# Patient Record
Sex: FEMALE | Race: WHITE | Employment: FULL TIME | URBAN - METROPOLITAN AREA
[De-identification: names, ages, dates, MRNs, and addresses within clinical notes are randomized per-mention and may not be internally consistent; named-entity substitution may affect disease eponyms.]

---

## 2018-07-13 ENCOUNTER — HOSPITAL ENCOUNTER (INPATIENT)
Dept: CARDIOLOGY UNIT | Age: 43
LOS: 2 days | Discharge: HOME | DRG: 247 | End: 2018-07-15
Attending: EMERGENCY MEDICINE | Admitting: EMERGENCY MEDICINE
Payer: COMMERCIAL

## 2018-07-13 DIAGNOSIS — R07.9 CHEST PAIN, UNSPECIFIED TYPE: Primary | ICD-10-CM

## 2018-07-13 DIAGNOSIS — R94.31 EKG ABNORMALITIES: ICD-10-CM

## 2018-07-13 PROBLEM — I21.3 STEMI (ST ELEVATION MYOCARDIAL INFARCTION) (HCC): Status: ACTIVE | Noted: 2018-07-13

## 2018-07-13 PROBLEM — I21.02: Status: ACTIVE | Noted: 2018-07-13

## 2018-07-13 LAB
A/G RATIO: 1.2 (ref 1.1–2.2)
ALBUMIN SERPL-MCNC: 3.9 G/DL (ref 3.4–5)
ALP BLD-CCNC: 75 U/L (ref 40–129)
ALT SERPL-CCNC: 21 U/L (ref 10–40)
ANION GAP SERPL CALCULATED.3IONS-SCNC: 11 MMOL/L (ref 3–16)
AST SERPL-CCNC: 17 U/L (ref 15–37)
BASOPHILS ABSOLUTE: 0.1 K/UL (ref 0–0.2)
BASOPHILS RELATIVE PERCENT: 0.9 %
BILIRUB SERPL-MCNC: 0.3 MG/DL (ref 0–1)
BUN BLDV-MCNC: 14 MG/DL (ref 7–20)
CALCIUM SERPL-MCNC: 9.7 MG/DL (ref 8.3–10.6)
CHLORIDE BLD-SCNC: 106 MMOL/L (ref 99–110)
CHOLESTEROL, TOTAL: 186 MG/DL (ref 0–199)
CO2: 22 MMOL/L (ref 21–32)
CREAT SERPL-MCNC: 0.6 MG/DL (ref 0.6–1.1)
EKG ATRIAL RATE: 71 BPM
EKG ATRIAL RATE: 80 BPM
EKG DIAGNOSIS: NORMAL
EKG DIAGNOSIS: NORMAL
EKG P AXIS: 50 DEGREES
EKG P AXIS: 55 DEGREES
EKG P-R INTERVAL: 150 MS
EKG P-R INTERVAL: 156 MS
EKG Q-T INTERVAL: 400 MS
EKG Q-T INTERVAL: 426 MS
EKG QRS DURATION: 80 MS
EKG QRS DURATION: 84 MS
EKG QTC CALCULATION (BAZETT): 461 MS
EKG QTC CALCULATION (BAZETT): 462 MS
EKG R AXIS: 49 DEGREES
EKG R AXIS: 81 DEGREES
EKG T AXIS: -9 DEGREES
EKG T AXIS: 68 DEGREES
EKG VENTRICULAR RATE: 71 BPM
EKG VENTRICULAR RATE: 80 BPM
EOSINOPHILS ABSOLUTE: 0.3 K/UL (ref 0–0.6)
EOSINOPHILS RELATIVE PERCENT: 3.4 %
GFR AFRICAN AMERICAN: >60
GFR NON-AFRICAN AMERICAN: >60
GLOBULIN: 3.3 G/DL
GLUCOSE BLD-MCNC: 135 MG/DL (ref 70–99)
HCT VFR BLD CALC: 44.7 % (ref 36–48)
HDLC SERPL-MCNC: 42 MG/DL (ref 40–60)
HEMOGLOBIN: 15.9 G/DL (ref 12–16)
INR BLD: 1.06 (ref 0.86–1.14)
LDL CHOLESTEROL CALCULATED: 99 MG/DL
LYMPHOCYTES ABSOLUTE: 2.1 K/UL (ref 1–5.1)
LYMPHOCYTES RELATIVE PERCENT: 27.9 %
MCH RBC QN AUTO: 33.4 PG (ref 26–34)
MCHC RBC AUTO-ENTMCNC: 35.5 G/DL (ref 31–36)
MCV RBC AUTO: 94.1 FL (ref 80–100)
MONOCYTES ABSOLUTE: 0.5 K/UL (ref 0–1.3)
MONOCYTES RELATIVE PERCENT: 7 %
NEUTROPHILS ABSOLUTE: 4.5 K/UL (ref 1.7–7.7)
NEUTROPHILS RELATIVE PERCENT: 60.8 %
PDW BLD-RTO: 12.9 % (ref 12.4–15.4)
PLATELET # BLD: 201 K/UL (ref 135–450)
PMV BLD AUTO: 8.9 FL (ref 5–10.5)
POC ACT LR: 267 SEC
POTASSIUM SERPL-SCNC: 4.2 MMOL/L (ref 3.5–5.1)
PROTHROMBIN TIME: 12.1 SEC (ref 9.8–13)
RBC # BLD: 4.75 M/UL (ref 4–5.2)
SODIUM BLD-SCNC: 139 MMOL/L (ref 136–145)
TOTAL PROTEIN: 7.2 G/DL (ref 6.4–8.2)
TRIGL SERPL-MCNC: 227 MG/DL (ref 0–150)
TROPONIN: <0.01 NG/ML
VLDLC SERPL CALC-MCNC: 45 MG/DL
WBC # BLD: 7.5 K/UL (ref 4–11)

## 2018-07-13 PROCEDURE — 99152 MOD SED SAME PHYS/QHP 5/>YRS: CPT | Performed by: INTERNAL MEDICINE

## 2018-07-13 PROCEDURE — 99291 CRITICAL CARE FIRST HOUR: CPT | Performed by: INTERNAL MEDICINE

## 2018-07-13 PROCEDURE — 85025 COMPLETE CBC W/AUTO DIFF WBC: CPT

## 2018-07-13 PROCEDURE — 80061 LIPID PANEL: CPT

## 2018-07-13 PROCEDURE — 36415 COLL VENOUS BLD VENIPUNCTURE: CPT

## 2018-07-13 PROCEDURE — C1760 CLOSURE DEV, VASC: HCPCS

## 2018-07-13 PROCEDURE — 71045 X-RAY EXAM CHEST 1 VIEW: CPT

## 2018-07-13 PROCEDURE — 92941 PRQ TRLML REVSC TOT OCCL AMI: CPT | Performed by: INTERNAL MEDICINE

## 2018-07-13 PROCEDURE — 93458 L HRT ARTERY/VENTRICLE ANGIO: CPT | Performed by: INTERNAL MEDICINE

## 2018-07-13 PROCEDURE — 4A023N8 MEASUREMENT OF CARDIAC SAMPLING AND PRESSURE, BILATERAL, PERCUTANEOUS APPROACH: ICD-10-PCS | Performed by: INTERNAL MEDICINE

## 2018-07-13 PROCEDURE — C1874 STENT, COATED/COV W/DEL SYS: HCPCS

## 2018-07-13 PROCEDURE — 99285 EMERGENCY DEPT VISIT HI MDM: CPT

## 2018-07-13 PROCEDURE — 94664 DEMO&/EVAL PT USE INHALER: CPT

## 2018-07-13 PROCEDURE — 80053 COMPREHEN METABOLIC PANEL: CPT

## 2018-07-13 PROCEDURE — 4A023N7 MEASUREMENT OF CARDIAC SAMPLING AND PRESSURE, LEFT HEART, PERCUTANEOUS APPROACH: ICD-10-PCS | Performed by: INTERNAL MEDICINE

## 2018-07-13 PROCEDURE — 9990 CHARGE CONVERSION

## 2018-07-13 PROCEDURE — 93458 L HRT ARTERY/VENTRICLE ANGIO: CPT

## 2018-07-13 PROCEDURE — 85347 COAGULATION TIME ACTIVATED: CPT

## 2018-07-13 PROCEDURE — B2151ZZ FLUOROSCOPY OF LEFT HEART USING LOW OSMOLAR CONTRAST: ICD-10-PCS | Performed by: INTERNAL MEDICINE

## 2018-07-13 PROCEDURE — C1725 CATH, TRANSLUMIN NON-LASER: HCPCS

## 2018-07-13 PROCEDURE — 96375 TX/PRO/DX INJ NEW DRUG ADDON: CPT

## 2018-07-13 PROCEDURE — 84484 ASSAY OF TROPONIN QUANT: CPT

## 2018-07-13 PROCEDURE — 99406 BEHAV CHNG SMOKING 3-10 MIN: CPT

## 2018-07-13 PROCEDURE — C1894 INTRO/SHEATH, NON-LASER: HCPCS

## 2018-07-13 PROCEDURE — 027034Z DILATION OF CORONARY ARTERY, ONE ARTERY WITH DRUG-ELUTING INTRALUMINAL DEVICE, PERCUTANEOUS APPROACH: ICD-10-PCS | Performed by: INTERNAL MEDICINE

## 2018-07-13 PROCEDURE — 93005 ELECTROCARDIOGRAM TRACING: CPT

## 2018-07-13 PROCEDURE — C1769 GUIDE WIRE: HCPCS

## 2018-07-13 PROCEDURE — 85610 PROTHROMBIN TIME: CPT

## 2018-07-13 PROCEDURE — 99255 IP/OBS CONSLTJ NEW/EST HI 80: CPT | Performed by: INTERNAL MEDICINE

## 2018-07-13 PROCEDURE — 96374 THER/PROPH/DIAG INJ IV PUSH: CPT

## 2018-07-13 PROCEDURE — 99152 MOD SED SAME PHYS/QHP 5/>YRS: CPT

## 2018-07-13 PROCEDURE — 92941 PRQ TRLML REVSC TOT OCCL AMI: CPT

## 2018-07-13 PROCEDURE — 99153 MOD SED SAME PHYS/QHP EA: CPT

## 2018-07-13 RX ORDER — FENTANYL CITRATE 50 UG/ML
50 INJECTION, SOLUTION INTRAMUSCULAR; INTRAVENOUS ONCE
Status: COMPLETED | OUTPATIENT
Start: 2018-07-13 | End: 2018-07-13

## 2018-07-13 RX ORDER — SODIUM CHLORIDE 9 MG/ML
INJECTION, SOLUTION INTRAVENOUS CONTINUOUS
Status: DISCONTINUED | OUTPATIENT
Start: 2018-07-13 | End: 2018-07-14

## 2018-07-13 RX ORDER — ASPIRIN 81 MG/1
81 TABLET, CHEWABLE ORAL DAILY
Status: DISCONTINUED | OUTPATIENT
Start: 2018-07-14 | End: 2018-07-15 | Stop reason: HOSPADM

## 2018-07-13 RX ORDER — CARVEDILOL 6.25 MG/1
3.12 TABLET ORAL 2 TIMES DAILY WITH MEALS
Status: DISCONTINUED | OUTPATIENT
Start: 2018-07-13 | End: 2018-07-15 | Stop reason: HOSPADM

## 2018-07-13 RX ORDER — MORPHINE SULFATE 4 MG/ML
INJECTION, SOLUTION INTRAMUSCULAR; INTRAVENOUS
Status: COMPLETED
Start: 2018-07-13 | End: 2018-07-13

## 2018-07-13 RX ORDER — NITROGLYCERIN 20 MG/100ML
5 INJECTION INTRAVENOUS CONTINUOUS
Status: DISCONTINUED | OUTPATIENT
Start: 2018-07-13 | End: 2018-07-14

## 2018-07-13 RX ORDER — ZOLPIDEM TARTRATE 5 MG/1
5 TABLET ORAL NIGHTLY PRN
Status: DISCONTINUED | OUTPATIENT
Start: 2018-07-13 | End: 2018-07-15 | Stop reason: HOSPADM

## 2018-07-13 RX ORDER — LOSARTAN POTASSIUM 25 MG/1
25 TABLET ORAL DAILY
Status: DISCONTINUED | OUTPATIENT
Start: 2018-07-13 | End: 2018-07-15 | Stop reason: HOSPADM

## 2018-07-13 RX ORDER — ACETAMINOPHEN 325 MG/1
650 TABLET ORAL EVERY 4 HOURS PRN
Status: DISCONTINUED | OUTPATIENT
Start: 2018-07-13 | End: 2018-07-15 | Stop reason: HOSPADM

## 2018-07-13 RX ORDER — ONDANSETRON 2 MG/ML
4 INJECTION INTRAMUSCULAR; INTRAVENOUS EVERY 6 HOURS PRN
Status: DISCONTINUED | OUTPATIENT
Start: 2018-07-13 | End: 2018-07-15 | Stop reason: HOSPADM

## 2018-07-13 RX ORDER — LISINOPRIL 10 MG/1
5 TABLET ORAL DAILY
Status: DISCONTINUED | OUTPATIENT
Start: 2018-07-13 | End: 2018-07-13

## 2018-07-13 RX ORDER — MIDAZOLAM HYDROCHLORIDE 1 MG/ML
2 INJECTION INTRAMUSCULAR; INTRAVENOUS ONCE
Status: COMPLETED | OUTPATIENT
Start: 2018-07-13 | End: 2018-07-13

## 2018-07-13 RX ORDER — PRAVASTATIN SODIUM 80 MG/1
80 TABLET ORAL DAILY
Status: ON HOLD | COMMUNITY
End: 2018-07-15 | Stop reason: HOSPADM

## 2018-07-13 RX ORDER — ATORVASTATIN CALCIUM 80 MG/1
80 TABLET, FILM COATED ORAL NIGHTLY
Status: DISCONTINUED | OUTPATIENT
Start: 2018-07-13 | End: 2018-07-15 | Stop reason: HOSPADM

## 2018-07-13 RX ORDER — PANTOPRAZOLE SODIUM 40 MG/1
40 TABLET, DELAYED RELEASE ORAL
Status: DISCONTINUED | OUTPATIENT
Start: 2018-07-13 | End: 2018-07-15 | Stop reason: HOSPADM

## 2018-07-13 RX ORDER — HEPARIN SODIUM 1000 [USP'U]/ML
5000 INJECTION, SOLUTION INTRAVENOUS; SUBCUTANEOUS ONCE
Status: COMPLETED | OUTPATIENT
Start: 2018-07-13 | End: 2018-07-13

## 2018-07-13 RX ADMIN — Medication: at 11:47

## 2018-07-13 RX ADMIN — CARVEDILOL 3.12 MG: 6.25 TABLET, FILM COATED ORAL at 18:00

## 2018-07-13 RX ADMIN — PANTOPRAZOLE SODIUM 40 MG: 40 TABLET, DELAYED RELEASE ORAL at 11:46

## 2018-07-13 RX ADMIN — NITROGLYCERIN 5 MCG/MIN: 20 INJECTION INTRAVENOUS at 06:51

## 2018-07-13 RX ADMIN — FENTANYL CITRATE 50 MCG: 50 INJECTION, SOLUTION INTRAMUSCULAR; INTRAVENOUS at 07:47

## 2018-07-13 RX ADMIN — MORPHINE SULFATE 4 MG: 4 INJECTION, SOLUTION INTRAMUSCULAR; INTRAVENOUS at 06:36

## 2018-07-13 RX ADMIN — HEPARIN SODIUM 5000 UNITS: 1000 INJECTION, SOLUTION INTRAVENOUS; SUBCUTANEOUS at 06:47

## 2018-07-13 RX ADMIN — LOSARTAN POTASSIUM 25 MG: 25 TABLET, FILM COATED ORAL at 13:31

## 2018-07-13 RX ADMIN — TICAGRELOR 90 MG: 90 TABLET ORAL at 20:07

## 2018-07-13 RX ADMIN — CARVEDILOL 3.12 MG: 6.25 TABLET, FILM COATED ORAL at 11:46

## 2018-07-13 RX ADMIN — MIDAZOLAM HYDROCHLORIDE 2 MG: 1 INJECTION INTRAMUSCULAR; INTRAVENOUS at 07:48

## 2018-07-13 RX ADMIN — ATORVASTATIN CALCIUM 80 MG: 80 TABLET, FILM COATED ORAL at 20:07

## 2018-07-13 RX ADMIN — Medication: at 20:07

## 2018-07-13 ASSESSMENT — PAIN SCALES - GENERAL
PAINLEVEL_OUTOF10: 9
PAINLEVEL_OUTOF10: 0
PAINLEVEL_OUTOF10: 9
PAINLEVEL_OUTOF10: 5
PAINLEVEL_OUTOF10: 0

## 2018-07-13 ASSESSMENT — PAIN DESCRIPTION - LOCATION
LOCATION: CHEST
LOCATION: CHEST;BACK;JAW

## 2018-07-13 NOTE — PROGRESS NOTES
Pt refused lisinopril. Stated that it makes her cough really bad. Takes Losartan 50mg PO at home. Dr Vicky Carson notified. Orders for Losartan 25mg PO daily. Will continue to monitor.  Santa Miguel RN

## 2018-07-13 NOTE — ED PROVIDER NOTES
Emergency [facility]  ED    Patient: Evleine Haider  MRN: [id number]  : 1975  Date of Evaluation: 2018  ED Provider: Honey Farias DO    Chief Complaint       Chief Complaint   Patient presents with    Chest Pain     yesterday had bilateral arm heaviness; this morning at 0430 woke up with chest pains; EMS gave 324mg of ASA and Nitro x 1; Air Evac applied 1inch of nitro paste     Myrna Maloney is a 37 y.o. female who presents to the emergency department For chief complaint of chest pain. Patient was flown in by helicopter after EMS evaluated her this morning for onset of chest pain. Patient noticed sudden onset of chest pain around 4:30 AM this morning with associated shortness of breath and diaphoresis. EMS arrived and gave her aspirin and nitro which did improve her symptoms however her EKG showed signs concerning for possible STEMI and so she was flown here for further evaluation. Upon arrival here the patient's chest pain has persisted even with Nitropaste in place. Her EKG does show still concerning findings. Otherwise patient denies fever, headache, cough, abdominal pain, changes in bowel movement or urinary symptoms. Patient does note multiple family members with history of heart conditions      ROS:     Review of Systems   All other systems reviewed and are negative.       Past History     Past Medical History:   Diagnosis Date    Depression     Hyperlipidemia     Hypertension      Past Surgical History:   Procedure Laterality Date     SECTION      DILATION AND CURETTAGE OF UTERUS      HYSTERECTOMY       Social History     Social History    Marital status:      Spouse name: N/A    Number of children: N/A    Years of education: N/A     Social History Main Topics    Smoking status: Current Every Day Smoker     Packs/day: 1.00     Types: Cigarettes    Smokeless tobacco: Never Used    Alcohol use None    Drug performed during the hospital encounter of 07/13/18   CBC Auto Differential   Result Value Ref Range    WBC 7.5 4.0 - 11.0 K/uL    RBC 4.75 4.00 - 5.20 M/uL    Hemoglobin 15.9 12.0 - 16.0 g/dL    Hematocrit 44.7 36.0 - 48.0 %    MCV 94.1 80.0 - 100.0 fL    MCH 33.4 26.0 - 34.0 pg    MCHC 35.5 31.0 - 36.0 g/dL    RDW 12.9 12.4 - 15.4 %    Platelets 540 148 - 429 K/uL    MPV 8.9 5.0 - 10.5 fL    Neutrophils % 60.8 %    Lymphocytes % 27.9 %    Monocytes % 7.0 %    Eosinophils % 3.4 %    Basophils % 0.9 %    Neutrophils # 4.5 1.7 - 7.7 K/uL    Lymphocytes # 2.1 1.0 - 5.1 K/uL    Monocytes # 0.5 0.0 - 1.3 K/uL    Eosinophils # 0.3 0.0 - 0.6 K/uL    Basophils # 0.1 0.0 - 0.2 K/uL   Protime-INR   Result Value Ref Range    Protime 12.1 9.8 - 13.0 sec    INR 1.06 0.86 - 1.14   Troponin   Result Value Ref Range    Troponin <0.01 <0.01 ng/mL   EKG 12 Lead   Result Value Ref Range    Ventricular Rate 80 BPM    Atrial Rate 80 BPM    P-R Interval 150 ms    QRS Duration 80 ms    Q-T Interval 400 ms    QTc Calculation (Bazett) 461 ms    P Axis 55 degrees    R Axis 49 degrees    T Axis -9 degrees    Diagnosis       Normal sinus rhythm  T wave abnormality, consider inferior ischemia  Abnormal ECG  No previous ECGs available       Radiographs:  No results found. Procedures/EKG:   Procedures    ED Course and MDM           In brief, Maryjane Arenas is a 37 y.o. female who presented to the emergency department For chief complaint of chest pain. Patient had concerning EKG findings and was flown in here for possible STEMI. Given her persistent EKG findings as well as her physical appearance, I did consult interventional cardiology. Dr. Rachel Ga agreed that the patient should go to the cath lab, and Dr. Nhung Torre came down to see and evaluate the patient. Patient be taken to Cath Lab for further evaluation, and both patient and family members were made aware of this. She agrees the plan at this time as no further concerns or questions.

## 2018-07-13 NOTE — BRIEF OP NOTE
[address] [facility]                                           Cardiology Procedure Note - Brief                                                     - Full note dictated -     Patient: Jonathan Whalen  Date:7/13/2018  Performing Physician: MD JUSTYN DonovanA    Indications:   Principal Problem:    Acute ST elevation myocardial infarction (STEMI) involving left anterior descending (LAD) coronary artery without development of Q waves ([address]  Resolved Problems:    * No resolved hospital problems. *      Procedure Performed:  1. Coronary angiogram  2. Left heart cath  3. Left ventriculogram  4. PCI of the DIAG with LAURA  5. Angioseal  6. Critical care time 40 minutes    Findings:  1. 99% occlusion of the D1   ~Mild disease of the remaining coronary arteries  2. Normal LVEF  3. Normal hemodynamics     Conclusion/plan of care:  1. DAPT  2.  ICU admission      Forrest Kong MD, [facility] [address], [facility], [address] [facility]  [phone] [facility]  7/13/2018  8:02 AM

## 2018-07-13 NOTE — PRE SEDATION
and Exam:  I have personally completed a history, physical exam & review of systems for this patient (see notes). Prior History of Anesthesia Complications:   none    Modified Mallampati:  II (soft palate, uvula, fauces visible)    ASA Classification:  Class 3 - A patient with severe systemic disease that limits activity but is not incapacitating    Nereida Scale: Activity:  2 - Able to move 4 extremities voluntarily on command  Respiration:  2 - Able to breathe deeply and cough freely  Circulation:  2 - BP+/- 20mmHg of normal  Consciousness:  2 - Fully awake  Oxygen Saturation (color):  2 - Able to maintain oxygen saturation >92% on room air    Sedation/Anesthesia Plan:  Guard the patient's safety and welfare. Minimize physical discomfort and pain. Minimize negative psychological responses to treatment by providing sedation and analgesia and maximize the potential amnesia. Patient to meet pre-procedure discharge plan.     Medication Planned:  midazolam intravenously, fentanyl intravenously    Patient is an appropriate candidate for plan of sedation: yes      Electronically signed by Barry Henley MD on 7/13/2018 at 8:00 AM

## 2018-07-13 NOTE — PROCEDURES
arterial sheath was introduced into  the common femoral artery using single arterial wall puncture and modified  Seldinger technique. Diagnostic catheter was initially Anatoly. Pigtail  catheter was advanced into the left ventricle. Left ventricular  end-diastolic pressure was measured at 13, aortic pressure was 130/80, left  ventricular pressure 140/16. No gradient across pullback. Wall motion  demonstrated EF of 55 to 60% with high lateral wall hypokinesis. Anatoly right diagnostic catheter was used for the right. The right is a  large vessel, probably codominant vessel, has a large PDA and small  posterolateral branch. There are some mild luminal irregularities  throughout the right coronary artery. The LAD is visualized with the XB  3.5 guide catheters. Left main is short. The left main has no disease. The LAD is visualized, moderate size vessel, has some mild disease. Mid  segment of the first diagonal is occluded, has 99.9% area of  atherosclerosis and TIMI3 flow in the distal vessel, likely cause for the  culprit and EKG changes. Circ is a large vessel, gives rise to a large  obtuse marginal branch and a large posterolateral branch. There is mild  disease within the circ, but nothing critical.  It appeared that the  culprit artery is the diag. At this point, the adjunctive pharmacotherapy  with aspirin, Aggrastat, heparin, and Brilinta was initiated. BMW wire was  advanced to the first diagonal without difficulty, second BMW into the LAD. We then used a 2.0 x 8 mini-TREK balloon and did balloon inflation of the  diagonal, which restored TIMI3 flow and resolution of the patient's chest  pain. At this point, we elected to place a stent, put a 2.0 x 8 Resolute  drug-eluting stent into the diagonal and covering the ostium and without  disruption of the LAD. At this point, intracoronary nitroglycerin was  given and wires were removed.   Post-angiography views revealed excellent  results with

## 2018-07-13 NOTE — CONSULTS
[facility]   Cardiovascular Evaluation    PATIENT: Mayela Willard  DATE: 2018  MRN: [id number]  CSN: [id number]  : 1975    Primary Care Doctor: DAVID Yao CNP    Reason for evaluation/Chief complaint:   Chest Pain (yesterday had bilateral arm heaviness; this morning at 0430 woke up with chest pains; EMS gave 324mg of ASA and Nitro x 1; Air Evac applied 1inch of nitro paste)      Subjective:    History of present illness on initial date of evaluation:   Mayela Willard is a 37 y.o. patient who presents with onset of chest pain. They described the chest pain as a pressure sensation on the anterior of the chest. The pain is at times radiating to the left arm/jaw area. The pain is at rest. They state that the pain has progressed and gotten worse since the time of onset. There is associated shortness or breath. At times, there is also associated diaphoresis. The pain has progressed to the point that medications have not resolved the pain. She awoke at 4 AM with severe chest pain but had been having symptoms since yesterday. Called EMS and activated helicopter transport to our ER. On arrival, confirmed STACEY             Patient Active Problem List   Diagnosis    Acute ST elevation myocardial infarction (STEMI) involving left anterior descending (LAD) coronary artery without development of Q waves ([address]         Cardiac Testing: I have reviewed the findings below. EKG:  ECHO:   STRESS TEST:  CATH:  BYPASS:  VASCULAR:    Past Medical History:   has a past medical history of Depression; Hyperlipidemia; and Hypertension. Surgical History:   has a past surgical history that includes  section; Hysterectomy; and Dilation and curettage of uterus. Social History:   reports that she has been smoking Cigarettes. She has been smoking about 1.00 pack per day.  She has never used smokeless tobacco.     Family History:  No evidence for sudden cardiac death or and stress minimization. 4. The patient should be treated with these therapies unless contraindicated:   ~asa daily   ~beta-blockers   ~statin therapy   ~ACE/ARB   ~repeat troponin until down trending   ~EKG as clinically needed  5. Keep NPO   6. Pre-cath orders will be placed. All questions and concerns were addressed to the patient/family. Alternatives to my treatment were discussed. The note was completed using EMR. Every effort was made to ensure accuracy; however, inadvertent computerized transcription errors may be present.     Yolanda Jessica MD, [address], [facility]  [phone] [place] office  [phone] [facility] central  7/13/2018  8:03 AM

## 2018-07-13 NOTE — PAYOR INFORMATION
CHAPIN ESPINAL CALLED IN . .. REF# [id number] PER LAUREN. .. UR  [facility] TO [facility] Maverick Deng ATTN# REF# [id number]

## 2018-07-14 LAB
ANION GAP SERPL CALCULATED.3IONS-SCNC: 11 MMOL/L (ref 3–16)
BUN BLDV-MCNC: 13 MG/DL (ref 7–20)
CALCIUM SERPL-MCNC: 8.8 MG/DL (ref 8.3–10.6)
CHLORIDE BLD-SCNC: 100 MMOL/L (ref 99–110)
CO2: 24 MMOL/L (ref 21–32)
CREAT SERPL-MCNC: 0.6 MG/DL (ref 0.6–1.1)
EKG ATRIAL RATE: 95 BPM
EKG DIAGNOSIS: NORMAL
EKG P AXIS: 47 DEGREES
EKG P-R INTERVAL: 154 MS
EKG Q-T INTERVAL: 398 MS
EKG QRS DURATION: 82 MS
EKG QTC CALCULATION (BAZETT): 500 MS
EKG R AXIS: 62 DEGREES
EKG T AXIS: 104 DEGREES
EKG VENTRICULAR RATE: 95 BPM
GFR AFRICAN AMERICAN: >60
GFR NON-AFRICAN AMERICAN: >60
GLUCOSE BLD-MCNC: 104 MG/DL (ref 70–99)
HCT VFR BLD CALC: 43.3 % (ref 36–48)
HEMOGLOBIN: 15.3 G/DL (ref 12–16)
LV EF: 65 %
LVEF MODALITY: NORMAL
MCH RBC QN AUTO: 33.2 PG (ref 26–34)
MCHC RBC AUTO-ENTMCNC: 35.3 G/DL (ref 31–36)
MCV RBC AUTO: 94.1 FL (ref 80–100)
PDW BLD-RTO: 13.1 % (ref 12.4–15.4)
PLATELET # BLD: 192 K/UL (ref 135–450)
PMV BLD AUTO: 9.5 FL (ref 5–10.5)
POTASSIUM REFLEX MAGNESIUM: 3.6 MMOL/L (ref 3.5–5.1)
RBC # BLD: 4.61 M/UL (ref 4–5.2)
SODIUM BLD-SCNC: 135 MMOL/L (ref 136–145)
WBC # BLD: 9.7 K/UL (ref 4–11)

## 2018-07-14 PROCEDURE — 80048 BASIC METABOLIC PNL TOTAL CA: CPT

## 2018-07-14 PROCEDURE — 85027 COMPLETE CBC AUTOMATED: CPT

## 2018-07-14 PROCEDURE — 2000000000 HC ICU R&B

## 2018-07-14 PROCEDURE — 36415 COLL VENOUS BLD VENIPUNCTURE: CPT

## 2018-07-14 PROCEDURE — 93010 ELECTROCARDIOGRAM REPORT: CPT | Performed by: INTERNAL MEDICINE

## 2018-07-14 PROCEDURE — 9990 CHARGE CONVERSION

## 2018-07-14 PROCEDURE — 99232 SBSQ HOSP IP/OBS MODERATE 35: CPT | Performed by: NURSE PRACTITIONER

## 2018-07-14 PROCEDURE — 6370000000 HC RX 637 (ALT 250 FOR IP): Performed by: HOSPITALIST

## 2018-07-14 PROCEDURE — 93307 TTE W/O DOPPLER COMPLETE: CPT | Performed by: INTERNAL MEDICINE

## 2018-07-14 PROCEDURE — 93005 ELECTROCARDIOGRAM TRACING: CPT | Performed by: INTERNAL MEDICINE

## 2018-07-14 PROCEDURE — 6370000000 HC RX 637 (ALT 250 FOR IP): Performed by: INTERNAL MEDICINE

## 2018-07-14 PROCEDURE — 94761 N-INVAS EAR/PLS OXIMETRY MLT: CPT

## 2018-07-14 RX ORDER — POTASSIUM CHLORIDE 750 MG/1
40 TABLET, FILM COATED, EXTENDED RELEASE ORAL ONCE
Status: COMPLETED | OUTPATIENT
Start: 2018-07-14 | End: 2018-07-14

## 2018-07-14 RX ADMIN — TICAGRELOR 90 MG: 90 TABLET ORAL at 21:06

## 2018-07-14 RX ADMIN — TICAGRELOR 90 MG: 90 TABLET ORAL at 08:01

## 2018-07-14 RX ADMIN — POTASSIUM CHLORIDE 40 MEQ: 750 TABLET, EXTENDED RELEASE ORAL at 11:43

## 2018-07-14 RX ADMIN — ATORVASTATIN CALCIUM 80 MG: 80 TABLET, FILM COATED ORAL at 21:06

## 2018-07-14 RX ADMIN — ASPIRIN 81 MG 81 MG: 81 TABLET ORAL at 08:01

## 2018-07-14 RX ADMIN — PANTOPRAZOLE SODIUM 40 MG: 40 TABLET, DELAYED RELEASE ORAL at 08:01

## 2018-07-14 RX ADMIN — CARVEDILOL 3.12 MG: 6.25 TABLET, FILM COATED ORAL at 16:25

## 2018-07-14 RX ADMIN — Medication: at 08:07

## 2018-07-14 RX ADMIN — CARVEDILOL 3.12 MG: 6.25 TABLET, FILM COATED ORAL at 08:01

## 2018-07-14 RX ADMIN — Medication: at 21:07

## 2018-07-14 RX ADMIN — LOSARTAN POTASSIUM 25 MG: 25 TABLET, FILM COATED ORAL at 08:01

## 2018-07-14 ASSESSMENT — PAIN SCALES - GENERAL
PAINLEVEL_OUTOF10: 0

## 2018-07-14 NOTE — PROGRESS NOTES
Shift assessment completed. Pt resting in bed, denies complaint. VSS. Right groin cath site soft, no bruising, swelling or hematoma. NSR on the monitor. Lungs clear. Will monitor closely.

## 2018-07-14 NOTE — PROGRESS NOTES
Assessment unchanged. Pt up to the bathroom, tolerated well. Groin site c/d/i. Soft, no hematoma. Will monitor.

## 2018-07-14 NOTE — PROGRESS NOTES
[facility]   Daily Progress Note    Admit Date:  7/13/2018  HPI:    Chief Complaint   Patient presents with    Chest Pain     yesterday had bilateral arm heaviness; this morning at 0430 woke up with chest pains; EMS gave 324mg of ASA and Nitro x 1; Air Evac applied 1inch of nitro paste        Interval history: Ajith Pinedo is being seen for STEMI. Subjective:  Ms. Heide Velez denies any chest pain. Complains of very occasional shortness of breath with coming out of sleep, last a few seconds. Objective:   /77   Pulse 79   Temp 98 °F (36.7 °C) (Oral)   Resp 18   Ht 5' 4\" (1.626 m)   Wt 169 lb 12.1 oz (77 kg)   SpO2 97%   BMI 29.14 kg/m²     Intake/Output Summary (Last 24 hours) at 07/14/18 1250  Last data filed at 07/14/18 1137   Gross per 24 hour   Intake             3958 ml   Output              500 ml   Net             3458 ml       NYHA: I    Physical Exam:  General:  Awake, alert, NAD  Skin:  Warm and dry  Neck:  JVD<8  Chest:  Clear to auscultation, no wheezes/rhonchi/rales  Cardiovascular:  RRR S1S2, no m/r/g  Abdomen:  Soft, nontender, +bowel sounds  Extremities:  No BLE edema    Medications:    carvedilol  3.125 mg Oral BID WC    atorvastatin  80 mg Oral Nightly    aspirin  81 mg Oral Daily    ticagrelor  90 mg Oral BID    pantoprazole  40 mg Oral QAM AC    mupirocin   Nasal BID    losartan  25 mg Oral Daily      nitroGLYCERIN Stopped (07/13/18 0826)    sodium chloride Stopped (07/13/18 1954)       Lab Data:  CBC:   Recent Labs      07/13/18 0628  07/14/18   0414   WBC  7.5  9.7   HGB  15.9  15.3   PLT  201  192     BMP:  Recent Labs      07/13/18 0628  07/14/18   0414   NA  139  135*   K  4.2  3.6   CO2  22  24   BUN  14  13   CREATININE  0.6  0.6     INR:    Recent Labs      07/13/18 0628   INR  1.06     BNP:  No results for input(s): PROBNP in the last 72 hours. No results found for: LVEF, LVEFMODE    PROCEDURES PERFORMED: 7/13/18  1.   Selective

## 2018-07-14 NOTE — PROGRESS NOTES
Bedside report received. 4 eyed skin assessment completed. No evidence of skin breakdown. Mepilex dsg on sacrum. Pt alert and oriented x4. Denies chest pain. R fem- soft, no hematoma or bruising at site. Pt on RA. SpO2 98%, NSR 90s, /91. Alarms on and audible. Will continue to monitor.  Dede Gonzalez RN

## 2018-07-15 VITALS
HEIGHT: 64 IN | HEART RATE: 90 BPM | BODY MASS INDEX: 29.17 KG/M2 | RESPIRATION RATE: 18 BRPM | SYSTOLIC BLOOD PRESSURE: 123 MMHG | WEIGHT: 170.86 LBS | OXYGEN SATURATION: 98 % | TEMPERATURE: 98.4 F | DIASTOLIC BLOOD PRESSURE: 84 MMHG

## 2018-07-15 LAB
ANION GAP SERPL CALCULATED.3IONS-SCNC: 12 MMOL/L (ref 3–16)
BUN BLDV-MCNC: 13 MG/DL (ref 7–20)
CALCIUM SERPL-MCNC: 9.4 MG/DL (ref 8.3–10.6)
CHLORIDE BLD-SCNC: 100 MMOL/L (ref 99–110)
CO2: 28 MMOL/L (ref 21–32)
CREAT SERPL-MCNC: 0.6 MG/DL (ref 0.6–1.1)
GFR AFRICAN AMERICAN: >60
GFR NON-AFRICAN AMERICAN: >60
GLUCOSE BLD-MCNC: 113 MG/DL (ref 70–99)
POTASSIUM SERPL-SCNC: 3.9 MMOL/L (ref 3.5–5.1)
SODIUM BLD-SCNC: 140 MMOL/L (ref 136–145)

## 2018-07-15 PROCEDURE — 6370000000 HC RX 637 (ALT 250 FOR IP): Performed by: INTERNAL MEDICINE

## 2018-07-15 PROCEDURE — 36415 COLL VENOUS BLD VENIPUNCTURE: CPT

## 2018-07-15 PROCEDURE — 99232 SBSQ HOSP IP/OBS MODERATE 35: CPT | Performed by: NURSE PRACTITIONER

## 2018-07-15 PROCEDURE — 80048 BASIC METABOLIC PNL TOTAL CA: CPT

## 2018-07-15 RX ORDER — LOSARTAN POTASSIUM 25 MG/1
25 TABLET ORAL DAILY
Qty: 30 TABLET | Refills: 3 | Status: SHIPPED | OUTPATIENT
Start: 2018-07-15 | End: 2018-10-02 | Stop reason: SDUPTHER

## 2018-07-15 RX ORDER — CARVEDILOL 3.12 MG/1
3.12 TABLET ORAL 2 TIMES DAILY WITH MEALS
Qty: 60 TABLET | Refills: 3 | Status: SHIPPED | OUTPATIENT
Start: 2018-07-15 | End: 2018-10-02 | Stop reason: SDUPTHER

## 2018-07-15 RX ORDER — ATORVASTATIN CALCIUM 80 MG/1
80 TABLET, FILM COATED ORAL NIGHTLY
Qty: 30 TABLET | Refills: 3 | Status: SHIPPED | OUTPATIENT
Start: 2018-07-15 | End: 2018-10-02 | Stop reason: SDUPTHER

## 2018-07-15 RX ORDER — ASPIRIN 81 MG/1
81 TABLET, CHEWABLE ORAL DAILY
Qty: 30 TABLET | Refills: 3 | Status: SHIPPED | OUTPATIENT
Start: 2018-07-15 | End: 2018-11-11 | Stop reason: SDUPTHER

## 2018-07-15 RX ADMIN — PANTOPRAZOLE SODIUM 40 MG: 40 TABLET, DELAYED RELEASE ORAL at 08:54

## 2018-07-15 RX ADMIN — CARVEDILOL 3.12 MG: 6.25 TABLET, FILM COATED ORAL at 08:53

## 2018-07-15 RX ADMIN — LOSARTAN POTASSIUM 25 MG: 25 TABLET, FILM COATED ORAL at 08:54

## 2018-07-15 RX ADMIN — Medication: at 08:53

## 2018-07-15 RX ADMIN — ASPIRIN 81 MG 81 MG: 81 TABLET ORAL at 08:54

## 2018-07-15 RX ADMIN — TICAGRELOR 90 MG: 90 TABLET ORAL at 08:54

## 2018-07-15 ASSESSMENT — PAIN SCALES - GENERAL
PAINLEVEL_OUTOF10: 0
PAINLEVEL_OUTOF10: 0

## 2018-07-15 NOTE — PROGRESS NOTES
AVS completed, reviewed and questions answered. Patient received copy of AVS and original signed & in chart. Belongings with patient.  driving. Wheelchaired to the car at entrance. Patient stable. PIV removed.  Right groin intact/SHIVAM/no drainage    Gautam Sosa RN

## 2018-07-15 NOTE — PROGRESS NOTES
Went in to assess pt who was in bed talking on the phone. Pt asked if I could come back in a little while. Appeared comfortable, no apparent distress. Will return shortly.

## 2018-07-15 NOTE — PROGRESS NOTES
Hospitalist Progress Note      PCP: DAVID Westfall CNP    Date of Admission: 7/13/2018    Chief Complaint: chest pain    Hospital Course: this is 18-year-old female with history of hypertension, hyperlipidemia, tobacco abuse admitted with STEMI status post angiogram and stent to LAD on 7/13/18. Subjective: patient denies any chest pain or shortness of breath no nausea vomiting      Medications:  Reviewed    Infusion Medications   Scheduled Medications    carvedilol  3.125 mg Oral BID WC    atorvastatin  80 mg Oral Nightly    aspirin  81 mg Oral Daily    ticagrelor  90 mg Oral BID    pantoprazole  40 mg Oral QAM AC    mupirocin   Nasal BID    losartan  25 mg Oral Daily     PRN Meds: acetaminophen, magnesium hydroxide, ondansetron, zolpidem      Intake/Output Summary (Last 24 hours) at 07/15/18 0964  Last data filed at 07/15/18 0417   Gross per 24 hour   Intake             1560 ml   Output             2150 ml   Net             -590 ml       Physical Exam Performed:    /84   Pulse 90   Temp 98.4 °F (36.9 °C) (Oral)   Resp 18   Ht 5' 4\" (1.626 m)   Wt 170 lb 13.7 oz (77.5 kg)   SpO2 98%   BMI 29.33 kg/m²     General appearance: No apparent distress, appears stated age and cooperative. HEENT: Pupils equal, round, and reactive to light. Conjunctivae/corneas clear. Neck: Supple, with full range of motion. No jugular venous distention. Trachea midline. Respiratory:  Normal respiratory effort. Clear to auscultation, bilaterally without Rales/Wheezes/Rhonchi. Cardiovascular: Regular rate and rhythm with normal S1/S2 without murmurs, rubs or gallops. Abdomen: Soft, non-tender, non-distended with normal bowel sounds. Musculoskeletal: No clubbing, cyanosis or edema bilaterally. Full range of motion without deformity. Skin: Skin color, texture, turgor normal.  No rashes or lesions. Neurologic:  Neurovascularly intact without any focal sensory/motor deficits.  Cranial nerves: II-XII intact, grossly non-focal.  Psychiatric: Alert and oriented, thought content appropriate, normal insight  Capillary Refill: Brisk,< 3 seconds   Peripheral Pulses: +2 palpable, equal bilaterally       Labs:   Recent Labs      07/13/18   0628  07/14/18   0414   WBC  7.5  9.7   HGB  15.9  15.3   HCT  44.7  43.3   PLT  201  192     Recent Labs      07/13/18   0628  07/14/18   0414  07/15/18   0855   NA  139  135*  140   K  4.2  3.6  3.9   CL  106  100  100   CO2  22  24  28   BUN  14  13  13   CREATININE  0.6  0.6  0.6   CALCIUM  9.7  8.8  9.4     Recent Labs      07/13/18   0628   AST  17   ALT  21   BILITOT  0.3   ALKPHOS  75     Recent Labs      07/13/18 0628   INR  1.06     Recent Labs      07/13/18 0628   TROPONINI  <0.01       Urinalysis:    Lab Results   Component Value Date    NITRU Negative 04/26/2016    WBCUA 3-5 04/26/2016    BACTERIA 1+ 04/26/2016    RBCUA 5-10 04/26/2016    BLOODU SMALL 04/26/2016    SPECGRAV 1.020 04/26/2016    GLUCOSEU Negative 04/26/2016       Radiology:  XR CHEST PORTABLE   Final Result   Negative portable chest.                 Assessment/Plan:    Active Hospital Problems    Diagnosis Date Noted    Coronary artery disease involving native coronary artery of native heart without angina pectoris [I25.10]     Shortness of breath [R06.02]     Essential hypertension [I10]     Tobacco abuse [Z72.0]     Acute ST elevation myocardial infarction (STEMI) involving left anterior descending (LAD) coronary artery without development of Q waves ([address] [I21.02] 07/13/2018    STEMI (ST elevation myocardial infarction) ([facility] [facility] [I21.3] 07/13/2018    Chest pain [R07.9]    1. This is 47-year-old female admitted with STEMI status post angiogram and stent to LAD on 7/13/2018, continue the current care per per cardiology normal 2-D echo. Patient will be discharged home today to follow with the PCP within a week cardiology as instructed. 2.  Hypertension control continue with current meds.   3. Hyperlipidemia on high-dose statin.   4.  Tobacco abuse recommended patient to stop smoking      DVT Prophylaxis: Per cardiology  Diet: DIET CARDIAC;  Code Status: Full Code    PT/OT Eval Status:     Dispo - home    Giuseppe Plunkett MD

## 2018-07-15 NOTE — PROGRESS NOTES
4 Eyes Skin Assessment     The patient is being assess for   Shift Handoff    I agree that 2 RN's have performed a thorough Head to Toe Skin Assessment on the patient. ALL assessment sites listed below have been assessed. Areas assessed by both nurses:   [x]   Head, Face, and Ears   [x]   Shoulders, Back, and Chest, Abdomen  [x]   Arms, Elbows, and Hands   [x]   Coccyx, Sacrum, and Ischium  [x]   Legs, Feet, and Heels            **SHARE this note so that the co-signing nurse is able to place an eSignature**    Co-signer eSignature: Electronically signed by Cindy Chávez RN on 7/15/18 at 7:22 AM    Does the Patient have Skin Breakdown?   No          Vinh Prevention initiated:  NA   Wound Care Orders initiated:  NA      WOC nurse consulted for Pressure Injury (Stage 3,4, Unstageable, DTI, NWPT, Complex wounds)and New or Established Ostomies:  NA      Primary Nurse eSignature: Electronically signed by Tomasa Tran RN on 7/15/18 at 7:16 AM

## 2018-07-15 NOTE — PROGRESS NOTES
Source: [facility] Department: C2 Progressive Care/Cardiac ICU TimeToRetrieve: N/A Number: [phone] Requested: Loa Hodgkin Contacted: Loa Hodgkin Method: Secure Text Date / Time: 7/15 11:24 AM Details:  [address] Provider Message: [phone] FROM: Chapin Smart RN A C2 PROGRESSIVE CARE/CARDIAC ICU ANY UPDATE ON ETA FOR PATIENT DISCHARGE       Sharon Beach RN

## 2018-07-20 ENCOUNTER — TELEPHONE (OUTPATIENT)
Dept: CARDIOLOGY | Age: 43
End: 2018-07-20

## 2018-07-20 ENCOUNTER — TELEPHONE (OUTPATIENT)
Dept: CARDIOLOGY CLINIC | Age: 43
End: 2018-07-20

## 2018-07-20 NOTE — TELEPHONE ENCOUNTER
Attempted to call pt. Pt phone is currently not working. Ghazala Domingo NP would like to know when pt may return to work. Attempted to contact pt to find out what they do for a living. Pt phone is not currently in service. 7/13/2018 procedure with OSCAR WHALEN. [facility] Please advise. Will call [facility] at [phone].

## 2018-08-01 ENCOUNTER — OFFICE VISIT (OUTPATIENT)
Dept: CARDIOLOGY CLINIC | Age: 43
End: 2018-08-01

## 2018-08-01 VITALS
HEART RATE: 90 BPM | HEIGHT: 64 IN | SYSTOLIC BLOOD PRESSURE: 124 MMHG | WEIGHT: 169 LBS | OXYGEN SATURATION: 97 % | BODY MASS INDEX: 28.85 KG/M2 | DIASTOLIC BLOOD PRESSURE: 68 MMHG

## 2018-08-01 DIAGNOSIS — Z72.0 TOBACCO ABUSE: ICD-10-CM

## 2018-08-01 DIAGNOSIS — I21.3 ST ELEVATION MYOCARDIAL INFARCTION (STEMI), SUBSEQUENT EPISODE OF CARE (HCC): Primary | ICD-10-CM

## 2018-08-01 DIAGNOSIS — I10 ESSENTIAL HYPERTENSION: ICD-10-CM

## 2018-08-01 DIAGNOSIS — I25.10 CORONARY ARTERY DISEASE INVOLVING NATIVE CORONARY ARTERY OF NATIVE HEART WITHOUT ANGINA PECTORIS: ICD-10-CM

## 2018-08-01 DIAGNOSIS — E78.00 PURE HYPERCHOLESTEROLEMIA: ICD-10-CM

## 2018-08-01 PROCEDURE — 99213 OFFICE O/P EST LOW 20 MIN: CPT | Performed by: NURSE PRACTITIONER

## 2018-08-01 NOTE — PATIENT INSTRUCTIONS
1. Continue current heart medicines  2. Referral to cardiac rehab at [facility]  3. Follow up with Dr. Wale Knapp in 3 months     Ref.  Range 7/13/2018 06:26   Cholesterol, Total Latest Ref Range: 0 - 199 mg/dL 186   HDL Cholesterol Latest Ref Range: 40 - 60 mg/dL 42   LDL Calculated Latest Ref Range: <70 mg/dL 99   Triglycerides Latest Ref Range: 0 - 150 mg/dL 227 (H)

## 2018-08-01 NOTE — PROGRESS NOTES
Component Value Date    TRIG 227 07/13/2018    HDL 42 07/13/2018    LDLCALC 99 07/13/2018     Cardiac Imaging:  Echo 7/14/18  Normal left ventricular systolic function with ejection fraction of 65%. Mild concentric left ventricular hypertrophy. Grade I diastolic dysfunction with normal filling pressure. Trivial mitral regurgitation    PROCEDURES PERFORMED: 7/13/18  1. Selective coronary angiography. 2.  Left heart catheterization. 3.  Left ventriculography. 4.  Percutaneous coronary intervention of the first diagonal with an [place] drug-eluting stent. 5.  Angio-Seal device deployment. 6.  Critical care time of 40 minutes. ANGIOGRAPHY FINDINGS:  1.  A 99% stenosis of the first diagonal branch with mild disease of the remaining coronary arteries. 2.  Normal left ventricular function with ejection fraction of 55% with high lateral wall hypokinesis. 3.  Normal hemodynamics. Assessment:    1. ST elevation myocardial infarction (STEMI), subsequent episode of care ([facility] [address]    2. Coronary artery disease involving native coronary artery of native heart without angina pectoris    3. Essential hypertension    4. Pure hypercholesterolemia    5. Tobacco abuse          Plan:   1. Continue current heart medicines  2. Referral to cardiac rehab at [facility]  3. Follow up with Dr. Brittaney Alvarado in 3 months      I appreciate the opportunity of cooperating in the care of this individual.    Emmanuelle Cardona CNP, 8/1/2018, 3:25 PM    QUALITY MEASURES  1. Tobacco Cessation Counseling: Yes  2. Retake of BP if >140/90:   NA  3. Documentation to PCP/referring for new patient:  Sent to PCP at close of office visit  4. CAD patient on anti-platelet: Yes  5. CAD patient on STATIN therapy:  Yes  6.  Patient with CHF and aFib on anticoagulation:  NA

## 2018-10-02 RX ORDER — CARVEDILOL 3.12 MG/1
3.12 TABLET ORAL 2 TIMES DAILY WITH MEALS
Qty: 180 TABLET | Refills: 3 | Status: SHIPPED | OUTPATIENT
Start: 2018-10-02 | End: 2019-09-04 | Stop reason: SDUPTHER

## 2018-10-02 RX ORDER — LOSARTAN POTASSIUM 25 MG/1
25 TABLET ORAL DAILY
Qty: 90 TABLET | Refills: 3 | Status: SHIPPED | OUTPATIENT
Start: 2018-10-02 | End: 2018-11-02 | Stop reason: SINTOL

## 2018-10-02 RX ORDER — ATORVASTATIN CALCIUM 80 MG/1
80 TABLET, FILM COATED ORAL NIGHTLY
Qty: 90 TABLET | Refills: 3 | Status: SHIPPED | OUTPATIENT
Start: 2018-10-02 | End: 2019-05-09 | Stop reason: SINTOL

## 2018-11-02 ENCOUNTER — OFFICE VISIT (OUTPATIENT)
Dept: CARDIOLOGY CLINIC | Age: 43
End: 2018-11-02
Payer: COMMERCIAL

## 2018-11-02 VITALS
HEIGHT: 64 IN | BODY MASS INDEX: 28.27 KG/M2 | WEIGHT: 165.6 LBS | DIASTOLIC BLOOD PRESSURE: 62 MMHG | HEART RATE: 70 BPM | SYSTOLIC BLOOD PRESSURE: 92 MMHG | OXYGEN SATURATION: 98 %

## 2018-11-02 DIAGNOSIS — R06.02 SHORTNESS OF BREATH: Primary | ICD-10-CM

## 2018-11-02 DIAGNOSIS — I21.29 ST ELEVATION MYOCARDIAL INFARCTION (STEMI) INVOLVING OTHER CORONARY ARTERY (HCC): ICD-10-CM

## 2018-11-02 DIAGNOSIS — I25.10 CORONARY ARTERY DISEASE INVOLVING NATIVE CORONARY ARTERY OF NATIVE HEART WITHOUT ANGINA PECTORIS: ICD-10-CM

## 2018-11-02 DIAGNOSIS — Z72.0 TOBACCO ABUSE: ICD-10-CM

## 2018-11-02 DIAGNOSIS — I10 ESSENTIAL HYPERTENSION: ICD-10-CM

## 2018-11-02 PROCEDURE — 99214 OFFICE O/P EST MOD 30 MIN: CPT | Performed by: INTERNAL MEDICINE

## 2018-11-02 RX ORDER — FLUOXETINE HYDROCHLORIDE 20 MG/1
20 CAPSULE ORAL DAILY
COMMUNITY
End: 2020-12-02 | Stop reason: ALTCHOICE

## 2018-11-02 NOTE — COMMUNICATION BODY
beneficial to you as this provides a structured program and education on your health. I strongly encourage you to participate in this. 4. I want you to feel confidant in your health and know that with the right changes, you will do very well. I have no restrictions for you at this time. 5. Follow up with [facility] in 6 months. This note was scribed in the presence of Gely Walsh MD, by Nini Walters RN.      I, Dr. Gely Walsh, personally performed the services described in this documentation, as scribed by the above signed scribe in my presence. It is both accurate and complete to my knowledge. I agree with the details independently gathered by the clinical support staff, while the remaining scribed note accurately describes my personal service to the patient. All questions and concerns were addressed to the patient/family. Alternatives to my treatment were discussed. The note was completed using EMR. Every effort was made to ensure accuracy; however, inadvertent computerized transcription errors may be present.     Gely Walsh MD, [address], [place], [place]  [phone] [place] office  [phone] [facility]  11/2/2018  9:15 AM

## 2018-11-02 NOTE — PROGRESS NOTES
- dry eyes, eye pain or loss of vision  ENT ROS: negative for - nasal discharge or sore throat  Allergy and Immunology ROS: negative for - hives or itchy/watery eyes  Hematological and Lymphatic ROS: negative for - jaundice or night sweats  Endocrine ROS: negative for - mood swings or temperature intolerance  Breast ROS: deferred  Respiratory ROS: negative for - hemoptysis or stridor  Cardiovascular ROS: negative for - chest pain, dyspnea on exertion or palpitations  Gastrointestinal ROS: no abdominal pain, change in bowel habits, or black or bloody stools  Genito-Urinary ROS: no dysuria, trouble voiding, or hematuria  Musculoskeletal ROS: negative for - gait disturbance, joint pain or joint stiffness  Neurological ROS: negative for - seizures or speech problems  Dermatological ROS: negative for - rash or skin lesion changes      Physical Examination:    BP 92/62   Pulse 70   Ht 5' 4\" (1.626 m)   Wt 165 lb 9.6 oz (75.1 kg)   SpO2 98%   BMI 28.43 kg/m²   BP 92/62   Pulse 70   Ht 5' 4\" (1.626 m)   Wt 165 lb 9.6 oz (75.1 kg)   SpO2 98%   BMI 28.43 kg/m²    Weight: 165 lb 9.6 oz (75.1 kg)     Wt Readings from Last 3 Encounters:   11/02/18 165 lb 9.6 oz (75.1 kg)   08/01/18 169 lb (76.7 kg)   07/15/18 170 lb 13.7 oz (77.5 kg)     No intake or output data in the 24 hours ending 11/02/18 0915    General Appearance:  Alert, cooperative, no distress, appears stated age   Head:  Normocephalic, without obvious abnormality, atraumatic   Eyes:  PERRL, conjunctiva/corneas clear       Nose: Nares normal, no drainage or sinus tenderness   Throat: Lips, mucosa, and tongue normal   Neck: Supple, symmetrical, trachea midline, no adenopathy, thyroid: not enlarged, symmetric, no tenderness/mass/nodules, no carotid bruit or JVD       Lungs:   Clear to auscultation bilaterally, respirations unlabored   Chest Wall:  No tenderness or deformity   Heart:  Regular rhythm and normal rate; S1, S2 are normal; no murmur noted; no rub or was scribed in the presence of Dionne Velasquez MD, by Vasyl Kern RN.      I, Dr. Dionne Velasquez, personally performed the services described in this documentation, as scribed by the above signed scribe in my presence. It is both accurate and complete to my knowledge. I agree with the details independently gathered by the clinical support staff, while the remaining scribed note accurately describes my personal service to the patient. All questions and concerns were addressed to the patient/family. Alternatives to my treatment were discussed. The note was completed using EMR. Every effort was made to ensure accuracy; however, inadvertent computerized transcription errors may be present.     Dionne Velasquez MD, [address], [facility] [place], [place]  [phone] [address] [place] [address]  [phone] [facility]  11/2/2018  9:15 AM

## 2018-11-02 NOTE — PATIENT INSTRUCTIONS
Plan:  1. I would like you to stop your Losartan (Cozaar), due to your occasional dizziness. Your blood pressure is on the lower side of normal and this may a contributing factor to your dizziness. 2. We discussed the importance of life style changes, diet, exercise, medication compliance and the avoidance of smoking to avoid future cardiovascular events. 3. I feel that cardiac rehab would be very beneficial to you as this provides a structured program and education on your health. I strongly encourage you to participate in this. 4. I want you to feel confidant in your health and know that with the right changes, you will do very well. I have no restrictions for you at this time. 5. Follow up with [facility] in 6 months.

## 2018-11-07 ENCOUNTER — HOSPITAL ENCOUNTER (EMERGENCY)
Age: 43
Discharge: HOME | End: 2018-11-07
Attending: EMERGENCY MEDICINE
Payer: COMMERCIAL

## 2018-11-07 ENCOUNTER — APPOINTMENT (OUTPATIENT)
Dept: CT IMAGING | Age: 43
End: 2018-11-07
Payer: COMMERCIAL

## 2018-11-07 VITALS
DIASTOLIC BLOOD PRESSURE: 78 MMHG | RESPIRATION RATE: 18 BRPM | WEIGHT: 160 LBS | OXYGEN SATURATION: 100 % | TEMPERATURE: 98.2 F | HEIGHT: 64 IN | HEART RATE: 72 BPM | SYSTOLIC BLOOD PRESSURE: 137 MMHG | BODY MASS INDEX: 27.31 KG/M2

## 2018-11-07 DIAGNOSIS — F41.1 ANXIETY STATE: ICD-10-CM

## 2018-11-07 DIAGNOSIS — R31.9 HEMATURIA, UNSPECIFIED TYPE: Primary | ICD-10-CM

## 2018-11-07 LAB
A/G RATIO: 1.2 (ref 1.1–2.2)
ALBUMIN SERPL-MCNC: 4.3 G/DL (ref 3.4–5)
ALP BLD-CCNC: 116 U/L (ref 40–129)
ALT SERPL-CCNC: 24 U/L (ref 10–40)
ANION GAP SERPL CALCULATED.3IONS-SCNC: 13 MMOL/L (ref 3–16)
APTT: 33.4 SEC (ref 26–36)
AST SERPL-CCNC: 17 U/L (ref 15–37)
BASOPHILS ABSOLUTE: 0 K/UL (ref 0–0.2)
BASOPHILS RELATIVE PERCENT: 0.7 %
BILIRUB SERPL-MCNC: 0.6 MG/DL (ref 0–1)
BILIRUBIN URINE: ABNORMAL
BLOOD, URINE: ABNORMAL
BUN BLDV-MCNC: 13 MG/DL (ref 7–20)
CALCIUM SERPL-MCNC: 9.8 MG/DL (ref 8.3–10.6)
CHLORIDE BLD-SCNC: 103 MMOL/L (ref 99–110)
CLARITY: ABNORMAL
CO2: 26 MMOL/L (ref 21–32)
COLOR: YELLOW
CREAT SERPL-MCNC: 0.8 MG/DL (ref 0.6–1.1)
EOSINOPHILS ABSOLUTE: 0 K/UL (ref 0–0.6)
EOSINOPHILS RELATIVE PERCENT: 0.5 %
EPITHELIAL CELLS, UA: ABNORMAL /HPF
GFR AFRICAN AMERICAN: >60
GFR NON-AFRICAN AMERICAN: >60
GLOBULIN: 3.6 G/DL
GLUCOSE BLD-MCNC: 116 MG/DL (ref 70–99)
GLUCOSE URINE: NEGATIVE MG/DL
HCG(URINE) PREGNANCY TEST: NEGATIVE
HCT VFR BLD CALC: 47.3 % (ref 36–48)
HEMOGLOBIN: 15.9 G/DL (ref 12–16)
INR BLD: 1.25 (ref 0.86–1.14)
KETONES, URINE: ABNORMAL MG/DL
LEUKOCYTE ESTERASE, URINE: NEGATIVE
LYMPHOCYTES ABSOLUTE: 1.7 K/UL (ref 1–5.1)
LYMPHOCYTES RELATIVE PERCENT: 23.3 %
MCH RBC QN AUTO: 32.4 PG (ref 26–34)
MCHC RBC AUTO-ENTMCNC: 33.7 G/DL (ref 31–36)
MCV RBC AUTO: 96.1 FL (ref 80–100)
MICROSCOPIC EXAMINATION: YES
MONOCYTES ABSOLUTE: 0.6 K/UL (ref 0–1.3)
MONOCYTES RELATIVE PERCENT: 8.5 %
NEUTROPHILS ABSOLUTE: 4.9 K/UL (ref 1.7–7.7)
NEUTROPHILS RELATIVE PERCENT: 67 %
NITRITE, URINE: NEGATIVE
PDW BLD-RTO: 12.9 % (ref 12.4–15.4)
PH UA: 6
PLATELET # BLD: 235 K/UL (ref 135–450)
PMV BLD AUTO: 9.1 FL (ref 5–10.5)
POTASSIUM SERPL-SCNC: 4 MMOL/L (ref 3.5–5.1)
PROTEIN UA: NEGATIVE MG/DL
PROTHROMBIN TIME: 14.1 SEC (ref 9.8–13)
RBC # BLD: 4.92 M/UL (ref 4–5.2)
RBC UA: ABNORMAL /HPF (ref 0–2)
SODIUM BLD-SCNC: 142 MMOL/L (ref 136–145)
SPECIFIC GRAVITY UA: >=1.03
TOTAL PROTEIN: 7.9 G/DL (ref 6.4–8.2)
UROBILINOGEN, URINE: 1 E.U./DL
WBC # BLD: 7.2 K/UL (ref 4–11)
WBC UA: ABNORMAL /HPF (ref 0–5)

## 2018-11-07 PROCEDURE — 85730 THROMBOPLASTIN TIME PARTIAL: CPT

## 2018-11-07 PROCEDURE — 74176 CT ABD & PELVIS W/O CONTRAST: CPT

## 2018-11-07 PROCEDURE — 81001 URINALYSIS AUTO W/SCOPE: CPT

## 2018-11-07 PROCEDURE — 2580000003 HC RX 258: Performed by: EMERGENCY MEDICINE

## 2018-11-07 PROCEDURE — 84703 CHORIONIC GONADOTROPIN ASSAY: CPT

## 2018-11-07 PROCEDURE — 87086 URINE CULTURE/COLONY COUNT: CPT

## 2018-11-07 PROCEDURE — 85610 PROTHROMBIN TIME: CPT

## 2018-11-07 PROCEDURE — 36415 COLL VENOUS BLD VENIPUNCTURE: CPT

## 2018-11-07 PROCEDURE — 99284 EMERGENCY DEPT VISIT MOD MDM: CPT

## 2018-11-07 PROCEDURE — 80053 COMPREHEN METABOLIC PANEL: CPT

## 2018-11-07 PROCEDURE — 85025 COMPLETE CBC W/AUTO DIFF WBC: CPT

## 2018-11-07 RX ORDER — 0.9 % SODIUM CHLORIDE 0.9 %
1000 INTRAVENOUS SOLUTION INTRAVENOUS ONCE
Status: COMPLETED | OUTPATIENT
Start: 2018-11-07 | End: 2018-11-07

## 2018-11-07 RX ADMIN — SODIUM CHLORIDE 1000 ML: 900 INJECTION, SOLUTION INTRAVENOUS at 09:28

## 2018-11-07 ASSESSMENT — ENCOUNTER SYMPTOMS
SHORTNESS OF BREATH: 0
NAUSEA: 1
SORE THROAT: 0

## 2018-11-07 NOTE — ED PROVIDER NOTES
HISTORY   reports that she has been smoking Cigarettes. She has been smoking about 0.25 packs per day. She has never used smokeless tobacco. She reports that she drinks about 0.6 oz of alcohol per week . She reports that she does not use drugs. HOME MEDICATIONS     Prior to Admission medications    Medication Sig Start Date End Date Taking? Authorizing Provider   FLUoxetine (PROZAC) 20 MG capsule Take 20 mg by mouth daily   Yes Historical Provider, MD   atorvastatin (LIPITOR) 80 MG tablet Take 1 tablet by mouth nightly 10/2/18  Yes DAVID Mesa CNP   carvedilol (COREG) 3.125 MG tablet Take 1 tablet by mouth 2 times daily (with meals) 10/2/18  Yes DAVID Mesa CNP   ticagrelor (BRILINTA) 90 MG TABS tablet Take 1 tablet by mouth 2 times daily 10/2/18  Yes DAVID Boyle CNP   BuPROPion HCl (WELLBUTRIN PO) Take by mouth   Yes Historical Provider, MD   aspirin 81 MG chewable tablet Take 1 tablet by mouth daily 7/15/18  Yes DAVID Hardy CNP        ALLERGIES  is allergic to lisinopril and sulfa antibiotics. BP (!) 130/96   Pulse 81   Temp 98.2 °F (36.8 °C) (Oral)   Resp 18   Ht 5' 4\" (1.626 m)   Wt 160 lb (72.6 kg)   SpO2 100%   BMI 27.46 kg/m²      Physical Exam   Constitutional: She is oriented to person, place, and time. She appears well-developed. She appears distressed. HENT:   Head: Normocephalic and atraumatic. Right Ear: Tympanic membrane and external ear normal.   Left Ear: Tympanic membrane and external ear normal.   Mouth/Throat: Oropharynx is clear and moist. No oropharyngeal exudate, posterior oropharyngeal edema or posterior oropharyngeal erythema. Eyes: Pupils are equal, round, and reactive to light. Conjunctivae and EOM are normal.   Neck: Normal range of motion. Neck supple. No JVD present. Cardiovascular: Normal rate, regular rhythm and intact distal pulses. Exam reveals no gallop and no friction rub.     No murmur heard.  Pulmonary/Chest: Effort normal and breath sounds normal. No respiratory distress. She has no wheezes. She has no rales. She exhibits no tenderness. Abdominal: Soft. Bowel sounds are normal. She exhibits no distension and no mass. There is no tenderness. There is no rigidity, no rebound and no guarding. Musculoskeletal: Normal range of motion. She exhibits no edema or tenderness. Neurological: She is alert and oriented to person, place, and time. She has normal strength. No cranial nerve deficit or sensory deficit. She exhibits normal muscle tone. Coordination normal. GCS eye subscore is 4. GCS verbal subscore is 5. GCS motor subscore is 6. Skin: Skin is warm and dry. Capillary refill takes less than 2 seconds. No rash noted. She is not diaphoretic. Psychiatric: Her speech is normal and behavior is normal. Thought content normal. She is not actively hallucinating. Patient is very tearful. When this is particularly when she discusses being fired for the first time in her life. She states she is sleeping normally.        Procedures    MDM        Labs  Results for orders placed or performed during the hospital encounter of 11/07/18   Urinalysis with Microscopic   Result Value Ref Range    Color, UA Yellow Straw/Yellow    Clarity, UA SL CLOUDY (A) Clear    Glucose, Ur Negative Negative mg/dL    Bilirubin Urine SMALL (A) Negative    Ketones, Urine TRACE (A) Negative mg/dL    Specific Gravity, UA >=1.030 1.005 - 1.030    Blood, Urine LARGE (A) Negative    pH, UA 6.0 5.0 - 8.0    Protein, UA Negative Negative mg/dL    Urobilinogen, Urine 1.0 <2.0 E.U./dL    Nitrite, Urine Negative Negative    Leukocyte Esterase, Urine Negative Negative    Microscopic Examination YES     WBC, UA None seen 0 - 5 /HPF    RBC, UA 10-20 (A) 0 - 2 /HPF    Epi Cells 5-10 /HPF   Pregnancy, Urine   Result Value Ref Range    HCG(Urine) Pregnancy Test Negative Detects HCG level >20 MIU/mL   CBC Auto Differential   Result Value Ref Range    WBC 7.2 4.0 - 11.0 K/uL    RBC 4.92 4.00 - 5.20 M/uL    Hemoglobin 15.9 12.0 - 16.0 g/dL    Hematocrit 47.3 36.0 - 48.0 %    MCV 96.1 80.0 - 100.0 fL    MCH 32.4 26.0 - 34.0 pg    MCHC 33.7 31.0 - 36.0 g/dL    RDW 12.9 12.4 - 15.4 %    Platelets 062 988 - 726 K/uL    MPV 9.1 5.0 - 10.5 fL    Neutrophils % 67.0 %    Lymphocytes % 23.3 %    Monocytes % 8.5 %    Eosinophils % 0.5 %    Basophils % 0.7 %    Neutrophils # 4.9 1.7 - 7.7 K/uL    Lymphocytes # 1.7 1.0 - 5.1 K/uL    Monocytes # 0.6 0.0 - 1.3 K/uL    Eosinophils # 0.0 0.0 - 0.6 K/uL    Basophils # 0.0 0.0 - 0.2 K/uL   Comprehensive Metabolic Panel   Result Value Ref Range    Sodium 142 136 - 145 mmol/L    Potassium 4.0 3.5 - 5.1 mmol/L    Chloride 103 99 - 110 mmol/L    CO2 26 21 - 32 mmol/L    Anion Gap 13 3 - 16    Glucose 116 (H) 70 - 99 mg/dL    BUN 13 7 - 20 mg/dL    CREATININE 0.8 0.6 - 1.1 mg/dL    GFR Non-African American >60 >60    GFR African American >60 >60    Calcium 9.8 8.3 - 10.6 mg/dL    Total Protein 7.9 6.4 - 8.2 g/dL    Alb 4.3 3.4 - 5.0 g/dL    Albumin/Globulin Ratio 1.2 1.1 - 2.2    Total Bilirubin 0.6 0.0 - 1.0 mg/dL    Alkaline Phosphatase 116 40 - 129 U/L    ALT 24 10 - 40 U/L    AST 17 15 - 37 U/L    Globulin 3.6 g/dL         Radiology  The following radiographic studies were viewed by myself. Radiologist's interpretation:    Ct Abdomen Pelvis Wo Contrast    Result Date: 11/7/2018  EXAMINATION: CT OF THE ABDOMEN AND PELVIS WITHOUT CONTRAST 11/7/2018 9:25 am TECHNIQUE: CT of the abdomen and pelvis was performed without the administration of intravenous contrast. Multiplanar reformatted images are provided for review. Dose modulation, iterative reconstruction, and/or weight based adjustment of the mA/kV was utilized to reduce the radiation dose to as low as reasonably achievable.  COMPARISON: 04/26/2016 HISTORY: ORDERING SYSTEM PROVIDED HISTORY: ABDOMINAL PAIN TECHNOLOGIST PROVIDED HISTORY: Ordering Physician Provided Reason for Exam: PELVIC PAIN, HEMATURIA, HX KIDNEY STONES Acuity: Acute Type of Exam: Initial FINDINGS: Lower Chest: The visualized heart and lungs show no acute abnormalities. Organs: The liver, spleen, pancreas and gallbladder show no significant abnormalities. There is 1.5 cm lipid rich left adrenal adenoma. Right adrenal gland unremarkable. There is 3 mm nonobstructing calculus lower pole right kidney present on prior. 2 mm calculus lower pole left kidney and punctate left upper pole calculus noted also present on prior. No hydronephrosis. No ureteric calculi. GI/Bowel: There is limited evaluation due to absence of oral contrast. The stomach shows no focal lesions. Small bowel loops normal in caliber showing no focal abnormalities. No evidence for acute appendicitis evaluation of the colon shows no acute process. Pelvis: Urinary bladder grossly normal.  Pelvic phleboliths. No suspicious pelvic mass. Peritoneum/Retroperitoneum: No free intraperitoneal fluid or significant lymphadenopathy. Minor atherosclerotic disease. Bones/Soft Tissues: No acute abnormality of the bones. The superficial soft tissues show no significant abnormalities. 1. Redemonstration of sub 5 mm nonobstructing renal calculi 1 on the right and 2 on the left. 2. A 1.5 cm lipid rich left adrenal adenoma unchanged. Emergency Department Course:  I discussed with the patient contacting [facility] to try to see what arrangements might be made for outpatient therapy. I also discussed the patient checking her urine first to see if it was a UTI. She does not have any flank pain. She has not had any back pain. She is very hot this morning but has not documented any fevers and had dry heaves this morning. She has no history of kidney stones. She has had a complete hysterectomy. I spoke with Mainor Sesay. Patient's presentation examination were discussed. Patient will have an intake appointment tomorrow at 9:00.   And will go to the main registration desk. I went back to speak to the patient after the initial dip was done on the urine. She is agreeable to labs and a CAT scan. I also discussed with her that I spoke with psychiatry at [facility] and they are making arrangements to that she can have an intake appointment tomorrow at 9 AM and she is agreeable to this. 10:49 AM:  Discussed results, diagnosis and plan with patient and/or family. Questions addressed. Disposition and follow-up agreed upon. Specific discharge instructions explained. The patient and/or family and I have discussed the diagnosis and risks, and we agree with discharging home to follow-up with their primary care, specialist or referral doctor. We also discussed returning to the Emergency Department immediately if new or worsening symptoms occur. We have discussed the symptoms which are most concerning that necessitate immediate return. patient understands the need to follow-up with her family doctors and cardiologist.  And return for any problems. We discussed follow up with urology. She is also on Proventil. She voiced understanding of all this is agreeable to plan and is discharged in good condition and will return at anytime for any changes or problems. Is not felt that any further labs or imaging would be of benefit at this time. This document serves as a record of the services and decisions personally performed by Brenda Christensen MD. It was created on the provider's behalf by Rhett Carter, a trained medical scribe. The creation of this document is based on the provider's statements to the medical scribe. The document has been reviewed and approved by the provider. Jordan Peters, 8:29 AM 11/7/18 scribing for and in the presence of Brenda Christensen MD.        This dictation was generated by voice recognition computer software.   Although all attempts are made to edit the dictation for accuracy, there may be errors in the

## 2018-11-09 LAB — URINE CULTURE, ROUTINE: NORMAL

## 2018-11-12 RX ORDER — ATORVASTATIN CALCIUM 80 MG/1
TABLET, FILM COATED ORAL
Qty: 90 TABLET | Refills: 3 | Status: SHIPPED | OUTPATIENT
Start: 2018-11-12 | End: 2019-02-18 | Stop reason: ALTCHOICE

## 2018-11-12 RX ORDER — CARVEDILOL 3.12 MG/1
TABLET ORAL
Qty: 180 TABLET | Refills: 3 | Status: SHIPPED | OUTPATIENT
Start: 2018-11-12 | End: 2019-02-18 | Stop reason: ALTCHOICE

## 2018-11-12 RX ORDER — LORATADINE 10 MG
TABLET ORAL
Qty: 90 TABLET | Refills: 3 | Status: SHIPPED | OUTPATIENT
Start: 2018-11-12

## 2018-11-12 RX ORDER — LOSARTAN POTASSIUM 25 MG/1
TABLET ORAL
Qty: 90 TABLET | Refills: 3 | Status: SHIPPED | OUTPATIENT
Start: 2018-11-12 | End: 2019-05-09

## 2019-02-11 ENCOUNTER — HOSPITAL ENCOUNTER (OUTPATIENT)
Dept: CARDIAC REHAB | Age: 44
Setting detail: THERAPIES SERIES
Discharge: HOME | End: 2019-02-11
Payer: COMMERCIAL

## 2019-02-11 PROCEDURE — 93798 PHYS/QHP OP CAR RHAB W/ECG: CPT

## 2019-02-13 ENCOUNTER — HOSPITAL ENCOUNTER (OUTPATIENT)
Dept: CARDIAC REHAB | Age: 44
Setting detail: THERAPIES SERIES
Discharge: HOME | End: 2019-02-13
Payer: COMMERCIAL

## 2019-02-13 PROCEDURE — 93798 PHYS/QHP OP CAR RHAB W/ECG: CPT

## 2019-02-18 ENCOUNTER — APPOINTMENT (OUTPATIENT)
Dept: CT IMAGING | Age: 44
End: 2019-02-18
Payer: COMMERCIAL

## 2019-02-18 ENCOUNTER — HOSPITAL ENCOUNTER (EMERGENCY)
Age: 44
Discharge: HOME | End: 2019-02-18
Attending: EMERGENCY MEDICINE
Payer: COMMERCIAL

## 2019-02-18 VITALS
SYSTOLIC BLOOD PRESSURE: 144 MMHG | RESPIRATION RATE: 18 BRPM | DIASTOLIC BLOOD PRESSURE: 93 MMHG | BODY MASS INDEX: 27.97 KG/M2 | TEMPERATURE: 97.5 F | HEART RATE: 86 BPM | OXYGEN SATURATION: 99 % | WEIGHT: 152 LBS | HEIGHT: 62 IN

## 2019-02-18 DIAGNOSIS — R10.84 GENERALIZED ABDOMINAL PAIN: ICD-10-CM

## 2019-02-18 DIAGNOSIS — K59.00 CONSTIPATION, UNSPECIFIED CONSTIPATION TYPE: ICD-10-CM

## 2019-02-18 DIAGNOSIS — K52.9 COLITIS: Primary | ICD-10-CM

## 2019-02-18 LAB
A/G RATIO: 1.2 (ref 1.1–2.2)
ALBUMIN SERPL-MCNC: 4.2 G/DL (ref 3.4–5)
ALP BLD-CCNC: 106 U/L (ref 40–129)
ALT SERPL-CCNC: 17 U/L (ref 10–40)
ANION GAP SERPL CALCULATED.3IONS-SCNC: 9 MMOL/L (ref 3–16)
AST SERPL-CCNC: 15 U/L (ref 15–37)
BACTERIA: ABNORMAL /HPF
BASOPHILS ABSOLUTE: 0.2 K/UL (ref 0–0.2)
BASOPHILS RELATIVE PERCENT: 2.1 %
BILIRUB SERPL-MCNC: 0.5 MG/DL (ref 0–1)
BILIRUBIN URINE: NEGATIVE
BLOOD, URINE: ABNORMAL
BUN BLDV-MCNC: 10 MG/DL (ref 7–20)
CALCIUM SERPL-MCNC: 9.3 MG/DL (ref 8.3–10.6)
CHLORIDE BLD-SCNC: 105 MMOL/L (ref 99–110)
CLARITY: CLEAR
CO2: 28 MMOL/L (ref 21–32)
COLOR: YELLOW
CREAT SERPL-MCNC: 0.7 MG/DL (ref 0.6–1.1)
CRYSTALS, UA: ABNORMAL /HPF
EKG ATRIAL RATE: 76 BPM
EKG DIAGNOSIS: NORMAL
EKG P AXIS: 60 DEGREES
EKG P-R INTERVAL: 156 MS
EKG Q-T INTERVAL: 404 MS
EKG QRS DURATION: 80 MS
EKG QTC CALCULATION (BAZETT): 454 MS
EKG R AXIS: 46 DEGREES
EKG T AXIS: 76 DEGREES
EKG VENTRICULAR RATE: 76 BPM
EOSINOPHILS ABSOLUTE: 0.1 K/UL (ref 0–0.6)
EOSINOPHILS RELATIVE PERCENT: 1.5 %
EPITHELIAL CELLS, UA: ABNORMAL /HPF
GFR AFRICAN AMERICAN: >60
GFR NON-AFRICAN AMERICAN: >60
GLOBULIN: 3.5 G/DL
GLUCOSE BLD-MCNC: 129 MG/DL (ref 70–99)
GLUCOSE URINE: NEGATIVE MG/DL
HCT VFR BLD CALC: 44.9 % (ref 36–48)
HEMOGLOBIN: 15 G/DL (ref 12–16)
KETONES, URINE: NEGATIVE MG/DL
LEUKOCYTE ESTERASE, URINE: NEGATIVE
LIPASE: 22 U/L (ref 13–60)
LYMPHOCYTES ABSOLUTE: 1.2 K/UL (ref 1–5.1)
LYMPHOCYTES RELATIVE PERCENT: 14.1 %
MCH RBC QN AUTO: 32.1 PG (ref 26–34)
MCHC RBC AUTO-ENTMCNC: 33.5 G/DL (ref 31–36)
MCV RBC AUTO: 95.9 FL (ref 80–100)
MICROSCOPIC EXAMINATION: YES
MONOCYTES ABSOLUTE: 0.6 K/UL (ref 0–1.3)
MONOCYTES RELATIVE PERCENT: 6.8 %
MUCUS: ABNORMAL /LPF
NEUTROPHILS ABSOLUTE: 6.2 K/UL (ref 1.7–7.7)
NEUTROPHILS RELATIVE PERCENT: 75.5 %
NITRITE, URINE: NEGATIVE
OCCULT BLOOD DIAGNOSTIC: NORMAL
PDW BLD-RTO: 12.9 % (ref 12.4–15.4)
PH UA: 5.5
PLATELET # BLD: 218 K/UL (ref 135–450)
PMV BLD AUTO: 9.7 FL (ref 5–10.5)
POTASSIUM SERPL-SCNC: 3.5 MMOL/L (ref 3.5–5.1)
PROTEIN UA: ABNORMAL MG/DL
RBC # BLD: 4.68 M/UL (ref 4–5.2)
RBC UA: ABNORMAL /HPF (ref 0–2)
SODIUM BLD-SCNC: 142 MMOL/L (ref 136–145)
SPECIFIC GRAVITY UA: >=1.03
TOTAL PROTEIN: 7.7 G/DL (ref 6.4–8.2)
URINE REFLEX TO CULTURE: ABNORMAL
URINE TYPE: ABNORMAL
UROBILINOGEN, URINE: 0.2 E.U./DL
WBC # BLD: 8.2 K/UL (ref 4–11)
WBC UA: ABNORMAL /HPF (ref 0–5)

## 2019-02-18 PROCEDURE — 83690 ASSAY OF LIPASE: CPT

## 2019-02-18 PROCEDURE — 96374 THER/PROPH/DIAG INJ IV PUSH: CPT

## 2019-02-18 PROCEDURE — 74177 CT ABD & PELVIS W/CONTRAST: CPT

## 2019-02-18 PROCEDURE — 93010 ELECTROCARDIOGRAM REPORT: CPT | Performed by: INTERNAL MEDICINE

## 2019-02-18 PROCEDURE — 81001 URINALYSIS AUTO W/SCOPE: CPT

## 2019-02-18 PROCEDURE — 6360000002 HC RX W HCPCS: Performed by: EMERGENCY MEDICINE

## 2019-02-18 PROCEDURE — 99284 EMERGENCY DEPT VISIT MOD MDM: CPT

## 2019-02-18 PROCEDURE — 85025 COMPLETE CBC W/AUTO DIFF WBC: CPT

## 2019-02-18 PROCEDURE — 80053 COMPREHEN METABOLIC PANEL: CPT

## 2019-02-18 PROCEDURE — 6360000004 HC RX CONTRAST MEDICATION: Performed by: EMERGENCY MEDICINE

## 2019-02-18 PROCEDURE — 36415 COLL VENOUS BLD VENIPUNCTURE: CPT

## 2019-02-18 PROCEDURE — 93005 ELECTROCARDIOGRAM TRACING: CPT | Performed by: EMERGENCY MEDICINE

## 2019-02-18 PROCEDURE — G0328 FECAL BLOOD SCRN IMMUNOASSAY: HCPCS

## 2019-02-18 RX ORDER — HYDROCODONE BITARTRATE AND ACETAMINOPHEN 5; 325 MG/1; MG/1
1 TABLET ORAL EVERY 6 HOURS PRN
Qty: 12 TABLET | Refills: 0 | Status: SHIPPED | OUTPATIENT
Start: 2019-02-18 | End: 2019-02-21

## 2019-02-18 RX ORDER — METRONIDAZOLE 500 MG/1
500 TABLET ORAL 3 TIMES DAILY
Qty: 30 TABLET | Refills: 0 | Status: SHIPPED | OUTPATIENT
Start: 2019-02-18 | End: 2019-02-28

## 2019-02-18 RX ORDER — ONDANSETRON 2 MG/ML
4 INJECTION INTRAMUSCULAR; INTRAVENOUS ONCE
Status: COMPLETED | OUTPATIENT
Start: 2019-02-18 | End: 2019-02-18

## 2019-02-18 RX ORDER — CIPROFLOXACIN 500 MG/1
500 TABLET, FILM COATED ORAL 2 TIMES DAILY
Qty: 20 TABLET | Refills: 0 | Status: SHIPPED | OUTPATIENT
Start: 2019-02-18 | End: 2019-02-28

## 2019-02-18 RX ORDER — ONDANSETRON 4 MG/1
4 TABLET, ORALLY DISINTEGRATING ORAL EVERY 8 HOURS PRN
Qty: 20 TABLET | Refills: 0 | Status: SHIPPED | OUTPATIENT
Start: 2019-02-18 | End: 2019-05-09

## 2019-02-18 RX ADMIN — IOPAMIDOL 75 ML: 755 INJECTION, SOLUTION INTRAVENOUS at 06:40

## 2019-02-18 RX ADMIN — ONDANSETRON 4 MG: 2 INJECTION INTRAMUSCULAR; INTRAVENOUS at 06:15

## 2019-02-18 ASSESSMENT — PAIN DESCRIPTION - LOCATION: LOCATION: ABDOMEN

## 2019-02-18 ASSESSMENT — PAIN DESCRIPTION - DESCRIPTORS: DESCRIPTORS: CRAMPING

## 2019-02-18 ASSESSMENT — PAIN SCALES - GENERAL: PAINLEVEL_OUTOF10: 7

## 2019-02-22 ENCOUNTER — HOSPITAL ENCOUNTER (OUTPATIENT)
Dept: CARDIAC REHAB | Age: 44
Setting detail: THERAPIES SERIES
Discharge: HOME | End: 2019-02-22
Payer: COMMERCIAL

## 2019-02-22 PROCEDURE — 93798 PHYS/QHP OP CAR RHAB W/ECG: CPT

## 2019-02-25 ENCOUNTER — HOSPITAL ENCOUNTER (OUTPATIENT)
Dept: CARDIAC REHAB | Age: 44
Setting detail: THERAPIES SERIES
Discharge: HOME | End: 2019-02-25
Payer: COMMERCIAL

## 2019-02-25 PROCEDURE — 93798 PHYS/QHP OP CAR RHAB W/ECG: CPT

## 2019-02-27 ENCOUNTER — HOSPITAL ENCOUNTER (OUTPATIENT)
Dept: CARDIAC REHAB | Age: 44
Setting detail: THERAPIES SERIES
Discharge: HOME | End: 2019-02-27
Payer: COMMERCIAL

## 2019-02-27 PROCEDURE — 93798 PHYS/QHP OP CAR RHAB W/ECG: CPT

## 2019-05-09 ENCOUNTER — OFFICE VISIT (OUTPATIENT)
Dept: CARDIOLOGY CLINIC | Age: 44
End: 2019-05-09
Payer: COMMERCIAL

## 2019-05-09 VITALS
HEART RATE: 76 BPM | HEIGHT: 62 IN | SYSTOLIC BLOOD PRESSURE: 100 MMHG | OXYGEN SATURATION: 98 % | BODY MASS INDEX: 28.45 KG/M2 | DIASTOLIC BLOOD PRESSURE: 64 MMHG | WEIGHT: 154.6 LBS

## 2019-05-09 DIAGNOSIS — I10 ESSENTIAL HYPERTENSION: ICD-10-CM

## 2019-05-09 DIAGNOSIS — E78.00 PURE HYPERCHOLESTEROLEMIA: ICD-10-CM

## 2019-05-09 DIAGNOSIS — I25.10 CORONARY ARTERY DISEASE INVOLVING NATIVE CORONARY ARTERY OF NATIVE HEART WITHOUT ANGINA PECTORIS: Primary | ICD-10-CM

## 2019-05-09 DIAGNOSIS — Z72.0 TOBACCO ABUSE: ICD-10-CM

## 2019-05-09 PROCEDURE — 99213 OFFICE O/P EST LOW 20 MIN: CPT | Performed by: NURSE PRACTITIONER

## 2019-05-09 RX ORDER — ROSUVASTATIN CALCIUM 40 MG/1
40 TABLET, COATED ORAL DAILY
Qty: 90 TABLET | Refills: 1 | Status: SHIPPED | OUTPATIENT
Start: 2019-05-09 | End: 2019-10-27 | Stop reason: SDUPTHER

## 2019-05-09 NOTE — PATIENT INSTRUCTIONS
1. Stop the atorvastatin (Lipitor)  2. Start instead rosuvastatin (Crestor) 40 mg daily  3. No change in other heart medicines  4.  Follow up with Dr. Deirdre Monte in 6 months [facility]?)

## 2019-05-09 NOTE — PROGRESS NOTES
TABLET BY MOUTH EVERY DAY 11/12/18  Yes Jigar Rosado MD   FLUoxetine (PROZAC) 20 MG capsule Take 20 mg by mouth daily   Yes Historical Provider, MD   atorvastatin (LIPITOR) 80 MG tablet Take 1 tablet by mouth nightly 10/2/18  Yes DAVID Louise CNP   carvedilol (COREG) 3.125 MG tablet Take 1 tablet by mouth 2 times daily (with meals) 10/2/18  Yes DAVID Louise CNP   ticagrelor (BRILINTA) 90 MG TABS tablet Take 1 tablet by mouth 2 times daily 10/2/18  Yes DAVID Reaves CNP   BuPROPion HCl (WELLBUTRIN PO) Take by mouth   Yes Historical Provider, MD        Allergies:  Lisinopril and Sulfa antibiotics     Review of Systems:   Review of Systems   Eyes: Positive for visual disturbance. Cardiovascular: Negative for chest pain, palpitations and leg swelling. Neurological: Negative for weakness. Hematological: Bruises/bleeds easily. Psychiatric/Behavioral: Positive for decreased concentration. Physical Examination:    Vitals:    05/09/19 1002   BP: 100/64   Pulse: 76   SpO2: 98%   Weight: 154 lb 9.6 oz (70.1 kg)   Height: 5' 2\" (1.575 m)        Constitutional and General Appearance: Warm and dry, no apparent distress, normal coloration  HEENT:  Normocephalic, atraumatic  Respiratory:  · Normal excursion and expansion without use of accessory muscles  · Resp Auscultation: Clear to auscultation   Cardiovascular:  · The apical impulses not displaced  · Heart tones are crisp and normal  · JVP 8 cm H2O  · Regular rate and rhythm, normal S1S2, no m/g/r  · Peripheral pulses are symmetrical and full  · There is no clubbing, cyanosis of the extremities.   · No BLE edema  · Pedal Pulses: 2+ and equal   Abdomen:  · No masses or tenderness  · Liver/Spleen: No Abnormalities Noted  Neurological/Psychiatric:  · Alert and oriented in all spheres  · Moves all extremities well  · Exhibits normal gait balance and coordination  · No abnormalities of mood, affect, memory, mentation, or behavior are noted    Lab Data:  Most recent lab results below reviewed in office    CBC:   Lab Results   Component Value Date    WBC 8.2 02/18/2019    WBC 7.2 11/07/2018    WBC 9.7 07/14/2018    RBC 4.68 02/18/2019    RBC 4.92 11/07/2018    RBC 4.61 07/14/2018    HGB 15.0 02/18/2019    HGB 15.9 11/07/2018    HGB 15.3 07/14/2018    HCT 44.9 02/18/2019    HCT 47.3 11/07/2018    HCT 43.3 07/14/2018    MCV 95.9 02/18/2019    MCV 96.1 11/07/2018    MCV 94.1 07/14/2018    RDW 12.9 02/18/2019    RDW 12.9 11/07/2018    RDW 13.1 07/14/2018     02/18/2019     11/07/2018     07/14/2018     BMP:  Lab Results   Component Value Date     02/18/2019     11/07/2018     07/15/2018    K 3.5 02/18/2019    K 4.0 11/07/2018    K 3.9 07/15/2018    K 3.6 07/14/2018     02/18/2019     11/07/2018     07/15/2018    CO2 28 02/18/2019    CO2 26 11/07/2018    CO2 28 07/15/2018    BUN 10 02/18/2019    BUN 13 11/07/2018    BUN 13 07/15/2018    CREATININE 0.7 02/18/2019    CREATININE 0.8 11/07/2018    CREATININE 0.6 07/15/2018     BNP: No results found for: PROBNP  LIPID:   Lab Results   Component Value Date    TRIG 227 07/13/2018    HDL 42 07/13/2018    LDLCALC 99 07/13/2018       Cardiac Imaging:  Echo 7/14/18  Normal left ventricular systolic function with ejection fraction of 65%. Mild concentric left ventricular hypertrophy. Grade I diastolic dysfunction with normal filling pressure. Trivial mitral regurgitation  PROCEDURES PERFORMED: 7/13/18  1. Selective coronary angiography. 2.  Left heart catheterization. 3.  Left ventriculography. 4.  Percutaneous coronary intervention of the first diagonal with an Femi  drug-eluting stent. 5.  Angio-Seal device deployment. 6.  Critical care time of 40 minutes. ANGIOGRAPHY FINDINGS:  1.  A 99% stenosis of the first diagonal branch with mild disease of the  remaining coronary arteries.   2.  Normal left ventricular function with ejection fraction

## 2019-05-10 LAB
ALBUMIN SERPL-MCNC: 4 G/DL
ALP BLD-CCNC: 98 U/L
ALT SERPL-CCNC: 30 U/L
ANION GAP SERPL CALCULATED.3IONS-SCNC: 1.4 MMOL/L
AST SERPL-CCNC: 22 U/L
BASOPHILS ABSOLUTE: 0 /ΜL
BASOPHILS RELATIVE PERCENT: 0 %
BILIRUB SERPL-MCNC: 0.4 MG/DL (ref 0.1–1.4)
BUN BLDV-MCNC: 14 MG/DL
CALCIUM SERPL-MCNC: 9 MG/DL
CHLORIDE BLD-SCNC: 103 MMOL/L
CHOLESTEROL, TOTAL: 116 MG/DL
CHOLESTEROL/HDL RATIO: NORMAL
CO2: 23 MMOL/L
CREAT SERPL-MCNC: 0.82 MG/DL
EOSINOPHILS ABSOLUTE: 0.1 /ΜL
EOSINOPHILS RELATIVE PERCENT: 2 %
GFR CALCULATED: 86
GLUCOSE BLD-MCNC: 92 MG/DL
HCT VFR BLD CALC: 43.1 % (ref 36–46)
HDLC SERPL-MCNC: 41 MG/DL (ref 35–70)
HEMOGLOBIN: 14.2 G/DL (ref 12–16)
LDL CHOLESTEROL CALCULATED: 55 MG/DL (ref 0–160)
LYMPHOCYTES ABSOLUTE: 1.7 /ΜL
LYMPHOCYTES RELATIVE PERCENT: 30 %
MCH RBC QN AUTO: NORMAL PG
MCHC RBC AUTO-ENTMCNC: NORMAL G/DL
MCV RBC AUTO: NORMAL FL
MONOCYTES ABSOLUTE: 0.5 /ΜL
MONOCYTES RELATIVE PERCENT: 10 %
NEUTROPHILS ABSOLUTE: 3.3 /ΜL
NEUTROPHILS RELATIVE PERCENT: 58 %
PLATELET # BLD: 249 K/ΜL
PMV BLD AUTO: 96 FL
POTASSIUM SERPL-SCNC: 4.2 MMOL/L
RBC # BLD: 4.48 10^6/ΜL
SODIUM BLD-SCNC: 141 MMOL/L
TOTAL PROTEIN: 6.8
TRIGL SERPL-MCNC: 99 MG/DL
VLDLC SERPL CALC-MCNC: 20 MG/DL
WBC # BLD: 5.7 10^3/ML

## 2019-09-06 RX ORDER — CARVEDILOL 3.12 MG/1
TABLET ORAL
Qty: 180 TABLET | Refills: 3 | Status: SHIPPED | OUTPATIENT
Start: 2019-09-06 | End: 2020-08-31

## 2019-10-27 ENCOUNTER — TELEPHONE (OUTPATIENT)
Dept: CARDIOLOGY CLINIC | Age: 44
End: 2019-10-27

## 2019-10-28 RX ORDER — ROSUVASTATIN CALCIUM 40 MG/1
TABLET, COATED ORAL
Qty: 30 TABLET | Refills: 5 | Status: SHIPPED | OUTPATIENT
Start: 2019-10-28 | End: 2019-11-27

## 2019-11-26 ENCOUNTER — OFFICE VISIT (OUTPATIENT)
Dept: CARDIOLOGY CLINIC | Age: 44
End: 2019-11-26
Payer: COMMERCIAL

## 2019-11-26 VITALS
OXYGEN SATURATION: 97 % | WEIGHT: 163.6 LBS | HEIGHT: 62 IN | BODY MASS INDEX: 30.11 KG/M2 | HEART RATE: 82 BPM | DIASTOLIC BLOOD PRESSURE: 80 MMHG | SYSTOLIC BLOOD PRESSURE: 116 MMHG

## 2019-11-26 DIAGNOSIS — I25.10 CORONARY ARTERY DISEASE INVOLVING NATIVE CORONARY ARTERY OF NATIVE HEART WITHOUT ANGINA PECTORIS: ICD-10-CM

## 2019-11-26 DIAGNOSIS — I21.29 ST ELEVATION MYOCARDIAL INFARCTION (STEMI) INVOLVING OTHER CORONARY ARTERY (HCC): Primary | ICD-10-CM

## 2019-11-26 DIAGNOSIS — E78.00 PURE HYPERCHOLESTEROLEMIA: ICD-10-CM

## 2019-11-26 DIAGNOSIS — I10 ESSENTIAL HYPERTENSION: ICD-10-CM

## 2019-11-26 PROCEDURE — 99214 OFFICE O/P EST MOD 30 MIN: CPT | Performed by: INTERNAL MEDICINE

## 2019-11-27 DIAGNOSIS — E78.00 PURE HYPERCHOLESTEROLEMIA: Primary | ICD-10-CM

## 2019-11-27 RX ORDER — ROSUVASTATIN CALCIUM 40 MG/1
40 TABLET, COATED ORAL EVERY EVENING
Qty: 90 TABLET | Refills: 1 | Status: SHIPPED | OUTPATIENT
Start: 2019-11-27 | End: 2020-05-04

## 2019-12-21 ENCOUNTER — HOSPITAL ENCOUNTER (EMERGENCY)
Age: 44
Discharge: HOME | End: 2019-12-21
Attending: EMERGENCY MEDICINE
Payer: COMMERCIAL

## 2019-12-21 ENCOUNTER — APPOINTMENT (OUTPATIENT)
Dept: CT IMAGING | Age: 44
End: 2019-12-21
Payer: COMMERCIAL

## 2019-12-21 VITALS
WEIGHT: 160 LBS | DIASTOLIC BLOOD PRESSURE: 84 MMHG | OXYGEN SATURATION: 99 % | HEIGHT: 62 IN | RESPIRATION RATE: 16 BRPM | SYSTOLIC BLOOD PRESSURE: 110 MMHG | BODY MASS INDEX: 29.44 KG/M2 | HEART RATE: 95 BPM | TEMPERATURE: 98.7 F

## 2019-12-21 DIAGNOSIS — R11.2 NAUSEA VOMITING AND DIARRHEA: ICD-10-CM

## 2019-12-21 DIAGNOSIS — R19.7 NAUSEA VOMITING AND DIARRHEA: ICD-10-CM

## 2019-12-21 DIAGNOSIS — R19.7 DIARRHEA, UNSPECIFIED TYPE: Primary | ICD-10-CM

## 2019-12-21 LAB
A/G RATIO: 1.3 (ref 1.1–2.2)
ALBUMIN SERPL-MCNC: 4.5 G/DL (ref 3.4–5)
ALP BLD-CCNC: 93 U/L (ref 40–129)
ALT SERPL-CCNC: 20 U/L (ref 10–40)
ANION GAP SERPL CALCULATED.3IONS-SCNC: 13 MMOL/L (ref 3–16)
AST SERPL-CCNC: 12 U/L (ref 15–37)
BACTERIA: ABNORMAL /HPF
BASE EXCESS VENOUS: -3.2 MMOL/L (ref -3–3)
BASOPHILS ABSOLUTE: 0 K/UL (ref 0–0.2)
BASOPHILS RELATIVE PERCENT: 0.2 %
BILIRUB SERPL-MCNC: 0.5 MG/DL (ref 0–1)
BILIRUBIN URINE: ABNORMAL
BLOOD, URINE: ABNORMAL
BUN BLDV-MCNC: 16 MG/DL (ref 7–20)
CALCIUM SERPL-MCNC: 9.2 MG/DL (ref 8.3–10.6)
CARBOXYHEMOGLOBIN: 2.4 % (ref 0–1.5)
CHLORIDE BLD-SCNC: 104 MMOL/L (ref 99–110)
CLARITY: ABNORMAL
CO2: 24 MMOL/L (ref 21–32)
COLOR: YELLOW
CREAT SERPL-MCNC: 0.8 MG/DL (ref 0.6–1.1)
CRYSTALS, UA: ABNORMAL /HPF
EKG ATRIAL RATE: 100 BPM
EKG DIAGNOSIS: NORMAL
EKG P AXIS: 74 DEGREES
EKG P-R INTERVAL: 146 MS
EKG Q-T INTERVAL: 360 MS
EKG QRS DURATION: 72 MS
EKG QTC CALCULATION (BAZETT): 464 MS
EKG R AXIS: 63 DEGREES
EKG T AXIS: 75 DEGREES
EKG VENTRICULAR RATE: 100 BPM
EOSINOPHILS ABSOLUTE: 0.2 K/UL (ref 0–0.6)
EOSINOPHILS RELATIVE PERCENT: 1.6 %
EPITHELIAL CELLS, UA: ABNORMAL /HPF
GFR AFRICAN AMERICAN: >60
GFR NON-AFRICAN AMERICAN: >60
GLOBULIN: 3.5 G/DL
GLUCOSE BLD-MCNC: 121 MG/DL (ref 70–99)
GLUCOSE URINE: NEGATIVE MG/DL
HCO3 VENOUS: 21.9 MMOL/L (ref 23–29)
HCT VFR BLD CALC: 47.9 % (ref 36–48)
HEMOGLOBIN: 16.5 G/DL (ref 12–16)
KETONES, URINE: ABNORMAL MG/DL
LACTIC ACID: 0.7 MMOL/L (ref 0.4–2)
LEUKOCYTE ESTERASE, URINE: NEGATIVE
LIPASE: 14 U/L (ref 13–60)
LYMPHOCYTES ABSOLUTE: 1.4 K/UL (ref 1–5.1)
LYMPHOCYTES RELATIVE PERCENT: 13.1 %
MCH RBC QN AUTO: 32.3 PG (ref 26–34)
MCHC RBC AUTO-ENTMCNC: 34.4 G/DL (ref 31–36)
MCV RBC AUTO: 94 FL (ref 80–100)
METHEMOGLOBIN VENOUS: 0.1 %
MICROSCOPIC EXAMINATION: YES
MONOCYTES ABSOLUTE: 0.9 K/UL (ref 0–1.3)
MONOCYTES RELATIVE PERCENT: 8.2 %
MUCUS: ABNORMAL /LPF
NEUTROPHILS ABSOLUTE: 8 K/UL (ref 1.7–7.7)
NEUTROPHILS RELATIVE PERCENT: 76.9 %
NITRITE, URINE: NEGATIVE
O2 CONTENT, VEN: 16 VOL %
O2 SAT, VEN: 64 %
O2 THERAPY: ABNORMAL
PCO2, VEN: 39.9 MMHG (ref 40–50)
PDW BLD-RTO: 12.8 % (ref 12.4–15.4)
PH UA: 6 (ref 5–8)
PH VENOUS: 7.36 (ref 7.35–7.45)
PLATELET # BLD: 241 K/UL (ref 135–450)
PMV BLD AUTO: 9.3 FL (ref 5–10.5)
PO2, VEN: 34.3 MMHG (ref 25–40)
POTASSIUM REFLEX MAGNESIUM: 3.8 MMOL/L (ref 3.5–5.1)
PROTEIN UA: NEGATIVE MG/DL
RBC # BLD: 5.1 M/UL (ref 4–5.2)
RBC UA: ABNORMAL /HPF (ref 0–2)
SODIUM BLD-SCNC: 141 MMOL/L (ref 136–145)
SPECIFIC GRAVITY UA: >=1.03 (ref 1–1.03)
TCO2 CALC VENOUS: 23 MMOL/L
TOTAL PROTEIN: 8 G/DL (ref 6.4–8.2)
URINE TYPE: ABNORMAL
UROBILINOGEN, URINE: 0.2 E.U./DL
WBC # BLD: 10.4 K/UL (ref 4–11)
WBC UA: ABNORMAL /HPF (ref 0–5)

## 2019-12-21 PROCEDURE — 93010 ELECTROCARDIOGRAM REPORT: CPT | Performed by: INTERNAL MEDICINE

## 2019-12-21 PROCEDURE — 81001 URINALYSIS AUTO W/SCOPE: CPT

## 2019-12-21 PROCEDURE — 2580000003 HC RX 258: Performed by: EMERGENCY MEDICINE

## 2019-12-21 PROCEDURE — 36415 COLL VENOUS BLD VENIPUNCTURE: CPT

## 2019-12-21 PROCEDURE — 96361 HYDRATE IV INFUSION ADD-ON: CPT

## 2019-12-21 PROCEDURE — 83690 ASSAY OF LIPASE: CPT

## 2019-12-21 PROCEDURE — 6360000002 HC RX W HCPCS: Performed by: EMERGENCY MEDICINE

## 2019-12-21 PROCEDURE — 83605 ASSAY OF LACTIC ACID: CPT

## 2019-12-21 PROCEDURE — 74177 CT ABD & PELVIS W/CONTRAST: CPT

## 2019-12-21 PROCEDURE — 96375 TX/PRO/DX INJ NEW DRUG ADDON: CPT

## 2019-12-21 PROCEDURE — 99284 EMERGENCY DEPT VISIT MOD MDM: CPT

## 2019-12-21 PROCEDURE — 80053 COMPREHEN METABOLIC PANEL: CPT

## 2019-12-21 PROCEDURE — 2500000003 HC RX 250 WO HCPCS: Performed by: EMERGENCY MEDICINE

## 2019-12-21 PROCEDURE — 85025 COMPLETE CBC W/AUTO DIFF WBC: CPT

## 2019-12-21 PROCEDURE — 96374 THER/PROPH/DIAG INJ IV PUSH: CPT

## 2019-12-21 PROCEDURE — 82803 BLOOD GASES ANY COMBINATION: CPT

## 2019-12-21 PROCEDURE — 93005 ELECTROCARDIOGRAM TRACING: CPT | Performed by: EMERGENCY MEDICINE

## 2019-12-21 PROCEDURE — 6360000004 HC RX CONTRAST MEDICATION: Performed by: EMERGENCY MEDICINE

## 2019-12-21 RX ORDER — ONDANSETRON 2 MG/ML
4 INJECTION INTRAMUSCULAR; INTRAVENOUS ONCE
Status: COMPLETED | OUTPATIENT
Start: 2019-12-21 | End: 2019-12-21

## 2019-12-21 RX ORDER — DIPHENOXYLATE HYDROCHLORIDE AND ATROPINE SULFATE 2.5; .025 MG/1; MG/1
2 TABLET ORAL ONCE
Status: DISCONTINUED | OUTPATIENT
Start: 2019-12-21 | End: 2019-12-21 | Stop reason: HOSPADM

## 2019-12-21 RX ORDER — 0.9 % SODIUM CHLORIDE 0.9 %
500 INTRAVENOUS SOLUTION INTRAVENOUS ONCE
Status: COMPLETED | OUTPATIENT
Start: 2019-12-21 | End: 2019-12-21

## 2019-12-21 RX ORDER — ONDANSETRON 4 MG/1
4 TABLET, FILM COATED ORAL DAILY PRN
Qty: 30 TABLET | Refills: 0 | Status: SHIPPED | OUTPATIENT
Start: 2019-12-21 | End: 2020-09-06

## 2019-12-21 RX ORDER — DIPHENOXYLATE HYDROCHLORIDE AND ATROPINE SULFATE 2.5; .025 MG/1; MG/1
1 TABLET ORAL 4 TIMES DAILY PRN
Qty: 10 TABLET | Refills: 0 | Status: SHIPPED | OUTPATIENT
Start: 2019-12-21 | End: 2019-12-31

## 2019-12-21 RX ADMIN — IOPAMIDOL 75 ML: 755 INJECTION, SOLUTION INTRAVENOUS at 11:35

## 2019-12-21 RX ADMIN — ONDANSETRON HYDROCHLORIDE 4 MG: 2 INJECTION, SOLUTION INTRAMUSCULAR; INTRAVENOUS at 11:05

## 2019-12-21 RX ADMIN — SODIUM CHLORIDE 500 ML: 9 INJECTION, SOLUTION INTRAVENOUS at 11:03

## 2019-12-21 RX ADMIN — HYDROMORPHONE HYDROCHLORIDE 0.5 MG: 1 INJECTION, SOLUTION INTRAMUSCULAR; INTRAVENOUS; SUBCUTANEOUS at 11:05

## 2019-12-21 ASSESSMENT — PAIN DESCRIPTION - LOCATION: LOCATION: ABDOMEN

## 2019-12-21 ASSESSMENT — PAIN SCALES - GENERAL
PAINLEVEL_OUTOF10: 0
PAINLEVEL_OUTOF10: 3
PAINLEVEL_OUTOF10: 3

## 2019-12-21 ASSESSMENT — PAIN DESCRIPTION - DESCRIPTORS: DESCRIPTORS: CRAMPING

## 2019-12-21 ASSESSMENT — PAIN DESCRIPTION - PAIN TYPE: TYPE: ACUTE PAIN

## 2020-04-10 ENCOUNTER — TELEPHONE (OUTPATIENT)
Dept: CARDIOLOGY CLINIC | Age: 45
End: 2020-04-10

## 2020-05-04 RX ORDER — ROSUVASTATIN CALCIUM 40 MG/1
TABLET, COATED ORAL
Qty: 90 TABLET | Refills: 1 | Status: SHIPPED | OUTPATIENT
Start: 2020-05-04 | End: 2020-10-26 | Stop reason: SDUPTHER

## 2020-08-31 RX ORDER — CARVEDILOL 3.12 MG/1
TABLET ORAL
Qty: 180 TABLET | Refills: 1 | Status: SHIPPED | OUTPATIENT
Start: 2020-08-31 | End: 2020-10-26 | Stop reason: SDUPTHER

## 2020-09-06 ENCOUNTER — APPOINTMENT (OUTPATIENT)
Dept: CT IMAGING | Age: 45
End: 2020-09-06
Payer: COMMERCIAL

## 2020-09-06 ENCOUNTER — HOSPITAL ENCOUNTER (EMERGENCY)
Age: 45
Discharge: HOME | End: 2020-09-06
Attending: EMERGENCY MEDICINE
Payer: COMMERCIAL

## 2020-09-06 VITALS
SYSTOLIC BLOOD PRESSURE: 134 MMHG | OXYGEN SATURATION: 100 % | RESPIRATION RATE: 16 BRPM | BODY MASS INDEX: 28.35 KG/M2 | WEIGHT: 160 LBS | HEART RATE: 82 BPM | HEIGHT: 63 IN | TEMPERATURE: 98.4 F | DIASTOLIC BLOOD PRESSURE: 80 MMHG

## 2020-09-06 LAB
A/G RATIO: 1.4 (ref 1.1–2.2)
ALBUMIN SERPL-MCNC: 4.2 G/DL (ref 3.4–5)
ALP BLD-CCNC: 70 U/L (ref 40–129)
ALT SERPL-CCNC: 19 U/L (ref 10–40)
ANION GAP SERPL CALCULATED.3IONS-SCNC: 11 MMOL/L (ref 3–16)
AST SERPL-CCNC: 14 U/L (ref 15–37)
BASOPHILS ABSOLUTE: 0.1 K/UL (ref 0–0.2)
BASOPHILS RELATIVE PERCENT: 0.7 %
BILIRUB SERPL-MCNC: 0.5 MG/DL (ref 0–1)
BILIRUBIN URINE: NEGATIVE
BLOOD, URINE: ABNORMAL
BUN BLDV-MCNC: 10 MG/DL (ref 7–20)
CALCIUM SERPL-MCNC: 9.3 MG/DL (ref 8.3–10.6)
CHLORIDE BLD-SCNC: 106 MMOL/L (ref 99–110)
CLARITY: CLEAR
CO2: 23 MMOL/L (ref 21–32)
COLOR: YELLOW
CREAT SERPL-MCNC: 0.7 MG/DL (ref 0.6–1.1)
EOSINOPHILS ABSOLUTE: 0.1 K/UL (ref 0–0.6)
EOSINOPHILS RELATIVE PERCENT: 1.2 %
EPITHELIAL CELLS, UA: NORMAL /HPF (ref 0–5)
GFR AFRICAN AMERICAN: >60
GFR NON-AFRICAN AMERICAN: >60
GLOBULIN: 3.1 G/DL
GLUCOSE BLD-MCNC: 116 MG/DL (ref 70–99)
GLUCOSE URINE: NEGATIVE MG/DL
HCG QUALITATIVE: NEGATIVE
HCT VFR BLD CALC: 44.8 % (ref 36–48)
HEMOGLOBIN: 15.2 G/DL (ref 12–16)
KETONES, URINE: NEGATIVE MG/DL
LEUKOCYTE ESTERASE, URINE: NEGATIVE
LIPASE: 15 U/L (ref 13–60)
LYMPHOCYTES ABSOLUTE: 2.3 K/UL (ref 1–5.1)
LYMPHOCYTES RELATIVE PERCENT: 28.1 %
MCH RBC QN AUTO: 31.6 PG (ref 26–34)
MCHC RBC AUTO-ENTMCNC: 34 G/DL (ref 31–36)
MCV RBC AUTO: 92.9 FL (ref 80–100)
MICROSCOPIC EXAMINATION: YES
MONOCYTES ABSOLUTE: 0.6 K/UL (ref 0–1.3)
MONOCYTES RELATIVE PERCENT: 7 %
NEUTROPHILS ABSOLUTE: 5.1 K/UL (ref 1.7–7.7)
NEUTROPHILS RELATIVE PERCENT: 63 %
NITRITE, URINE: NEGATIVE
PDW BLD-RTO: 12.9 % (ref 12.4–15.4)
PH UA: 5 (ref 5–8)
PLATELET # BLD: 245 K/UL (ref 135–450)
PMV BLD AUTO: 8.9 FL (ref 5–10.5)
POTASSIUM REFLEX MAGNESIUM: 3.7 MMOL/L (ref 3.5–5.1)
PROTEIN UA: NEGATIVE MG/DL
RBC # BLD: 4.82 M/UL (ref 4–5.2)
RBC UA: NORMAL /HPF (ref 0–4)
SODIUM BLD-SCNC: 140 MMOL/L (ref 136–145)
SPECIFIC GRAVITY UA: 1.01 (ref 1–1.03)
TOTAL PROTEIN: 7.3 G/DL (ref 6.4–8.2)
URINE REFLEX TO CULTURE: ABNORMAL
URINE TYPE: ABNORMAL
UROBILINOGEN, URINE: 0.2 E.U./DL
WBC # BLD: 8.1 K/UL (ref 4–11)
WBC UA: NORMAL /HPF (ref 0–5)

## 2020-09-06 PROCEDURE — 80053 COMPREHEN METABOLIC PANEL: CPT

## 2020-09-06 PROCEDURE — 99284 EMERGENCY DEPT VISIT MOD MDM: CPT

## 2020-09-06 PROCEDURE — 6370000000 HC RX 637 (ALT 250 FOR IP): Performed by: EMERGENCY MEDICINE

## 2020-09-06 PROCEDURE — 6360000002 HC RX W HCPCS: Performed by: EMERGENCY MEDICINE

## 2020-09-06 PROCEDURE — 96375 TX/PRO/DX INJ NEW DRUG ADDON: CPT

## 2020-09-06 PROCEDURE — 84703 CHORIONIC GONADOTROPIN ASSAY: CPT

## 2020-09-06 PROCEDURE — 81001 URINALYSIS AUTO W/SCOPE: CPT

## 2020-09-06 PROCEDURE — 6360000004 HC RX CONTRAST MEDICATION: Performed by: EMERGENCY MEDICINE

## 2020-09-06 PROCEDURE — 83690 ASSAY OF LIPASE: CPT

## 2020-09-06 PROCEDURE — 36415 COLL VENOUS BLD VENIPUNCTURE: CPT

## 2020-09-06 PROCEDURE — 74177 CT ABD & PELVIS W/CONTRAST: CPT

## 2020-09-06 PROCEDURE — 85025 COMPLETE CBC W/AUTO DIFF WBC: CPT

## 2020-09-06 PROCEDURE — 96374 THER/PROPH/DIAG INJ IV PUSH: CPT

## 2020-09-06 PROCEDURE — 2580000003 HC RX 258: Performed by: EMERGENCY MEDICINE

## 2020-09-06 RX ORDER — CIPROFLOXACIN 500 MG/1
500 TABLET, FILM COATED ORAL 2 TIMES DAILY
Qty: 14 TABLET | Refills: 0 | Status: SHIPPED | OUTPATIENT
Start: 2020-09-06 | End: 2020-09-13

## 2020-09-06 RX ORDER — HYDROCODONE BITARTRATE AND ACETAMINOPHEN 5; 325 MG/1; MG/1
1 TABLET ORAL EVERY 6 HOURS PRN
Qty: 12 TABLET | Refills: 0 | Status: SHIPPED | OUTPATIENT
Start: 2020-09-06 | End: 2020-09-09

## 2020-09-06 RX ORDER — ONDANSETRON 4 MG/1
4 TABLET, FILM COATED ORAL 3 TIMES DAILY PRN
Qty: 15 TABLET | Refills: 0 | Status: SHIPPED | OUTPATIENT
Start: 2020-09-06 | End: 2020-12-02 | Stop reason: ALTCHOICE

## 2020-09-06 RX ORDER — KETOROLAC TROMETHAMINE 30 MG/ML
15 INJECTION, SOLUTION INTRAMUSCULAR; INTRAVENOUS ONCE
Status: COMPLETED | OUTPATIENT
Start: 2020-09-06 | End: 2020-09-06

## 2020-09-06 RX ORDER — METRONIDAZOLE 500 MG/1
500 TABLET ORAL 2 TIMES DAILY
Qty: 14 TABLET | Refills: 0 | Status: SHIPPED | OUTPATIENT
Start: 2020-09-06 | End: 2020-09-13

## 2020-09-06 RX ORDER — 0.9 % SODIUM CHLORIDE 0.9 %
1000 INTRAVENOUS SOLUTION INTRAVENOUS ONCE
Status: COMPLETED | OUTPATIENT
Start: 2020-09-06 | End: 2020-09-06

## 2020-09-06 RX ORDER — METRONIDAZOLE 250 MG/1
500 TABLET ORAL ONCE
Status: COMPLETED | OUTPATIENT
Start: 2020-09-06 | End: 2020-09-06

## 2020-09-06 RX ORDER — CIPROFLOXACIN 500 MG/1
500 TABLET, FILM COATED ORAL ONCE
Status: COMPLETED | OUTPATIENT
Start: 2020-09-06 | End: 2020-09-06

## 2020-09-06 RX ORDER — ONDANSETRON 2 MG/ML
4 INJECTION INTRAMUSCULAR; INTRAVENOUS ONCE
Status: COMPLETED | OUTPATIENT
Start: 2020-09-06 | End: 2020-09-06

## 2020-09-06 RX ADMIN — ONDANSETRON HYDROCHLORIDE 4 MG: 2 INJECTION, SOLUTION INTRAMUSCULAR; INTRAVENOUS at 12:38

## 2020-09-06 RX ADMIN — METRONIDAZOLE 500 MG: 250 TABLET ORAL at 14:40

## 2020-09-06 RX ADMIN — CIPROFLOXACIN 500 MG: 500 TABLET, FILM COATED ORAL at 14:40

## 2020-09-06 RX ADMIN — IOPAMIDOL 75 ML: 755 INJECTION, SOLUTION INTRAVENOUS at 13:46

## 2020-09-06 RX ADMIN — KETOROLAC TROMETHAMINE 15 MG: 30 INJECTION, SOLUTION INTRAMUSCULAR at 12:38

## 2020-09-06 RX ADMIN — SODIUM CHLORIDE 1000 ML: 9 INJECTION, SOLUTION INTRAVENOUS at 12:38

## 2020-09-06 ASSESSMENT — PAIN DESCRIPTION - ORIENTATION: ORIENTATION: LOWER

## 2020-09-06 ASSESSMENT — PAIN DESCRIPTION - PAIN TYPE: TYPE: ACUTE PAIN

## 2020-09-06 ASSESSMENT — PAIN SCALES - GENERAL
PAINLEVEL_OUTOF10: 3
PAINLEVEL_OUTOF10: 8

## 2020-09-06 ASSESSMENT — PAIN DESCRIPTION - LOCATION: LOCATION: ABDOMEN;BACK

## 2020-09-06 ASSESSMENT — PAIN DESCRIPTION - DESCRIPTORS: DESCRIPTORS: CONSTANT
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
## 2020-09-06 NOTE — ED NOTES
AVS provided and reviewed with the patient. The patient verbalized understanding of care at home, follow up care, and emergent symptoms to return for. The patient verbalized understanding of completing entire medication course as prescribed. Verbalized understanding of medication side effects and restrictions. No questions or concerns verbalized at this time. The patient is alert, oriented, stable, and ambulatory out of the department at the time of discharge. Discharged with prescriptions x4.      Jr Roberson RN  09/06/20 6484

## 2020-10-26 RX ORDER — CARVEDILOL 3.12 MG/1
TABLET ORAL
Qty: 180 TABLET | Refills: 1 | Status: SHIPPED | OUTPATIENT
Start: 2020-10-26 | End: 2020-11-30 | Stop reason: ALTCHOICE

## 2020-10-26 RX ORDER — ROSUVASTATIN CALCIUM 40 MG/1
TABLET, COATED ORAL
Qty: 90 TABLET | Refills: 1 | Status: SHIPPED | OUTPATIENT
Start: 2020-10-26 | End: 2020-11-02

## 2020-10-28 ENCOUNTER — HOSPITAL ENCOUNTER (OUTPATIENT)
Dept: ULTRASOUND IMAGING | Age: 45
Discharge: HOME | End: 2020-10-28
Payer: COMMERCIAL

## 2020-10-28 PROCEDURE — 76770 US EXAM ABDO BACK WALL COMP: CPT

## 2020-10-30 ENCOUNTER — HOSPITAL ENCOUNTER (OUTPATIENT)
Dept: MAMMOGRAPHY | Age: 45
Discharge: HOME | End: 2020-10-30
Payer: COMMERCIAL

## 2020-10-30 PROCEDURE — 77063 BREAST TOMOSYNTHESIS BI: CPT

## 2020-11-02 RX ORDER — ROSUVASTATIN CALCIUM 40 MG/1
TABLET, COATED ORAL
Qty: 90 TABLET | Refills: 1 | Status: SHIPPED | OUTPATIENT
Start: 2020-11-02 | End: 2021-01-26 | Stop reason: SDUPTHER

## 2020-11-30 ENCOUNTER — OFFICE VISIT (OUTPATIENT)
Dept: CARDIOLOGY CLINIC | Age: 45
End: 2020-11-30
Payer: COMMERCIAL

## 2020-11-30 VITALS
SYSTOLIC BLOOD PRESSURE: 90 MMHG | HEART RATE: 84 BPM | BODY MASS INDEX: 30.83 KG/M2 | DIASTOLIC BLOOD PRESSURE: 68 MMHG | HEIGHT: 63 IN | OXYGEN SATURATION: 97 % | WEIGHT: 174 LBS

## 2020-11-30 PROCEDURE — 99213 OFFICE O/P EST LOW 20 MIN: CPT | Performed by: INTERNAL MEDICINE

## 2020-11-30 NOTE — PROGRESS NOTES
[address] [facility]   Cardiovascular Evaluation    PATIENT: Colleen Araujo  DATE: 2020  MRN: <[id number]>  CSN: [id number]  : 1975    Primary Care Doctor: DAVID Vasquez CNP    Reason for evaluation/Chief complaint:   1 Year Follow Up; Coronary Artery Disease; and Hypertension      Subjective:    History of present illness on initial date of evaluation:   Colleen Araujo is a 50 y.o. patient who presents for follow up. Today she reports that she has been feeling well from a cardiac standpoint. She plans on undergoing a procedure on her bladder in the near future involving a biopsy. She also reports that she has been found to have ulcers in her colon that are being cultured. She has been taking her medications as prescribed without side effects. Patient Active Problem List   Diagnosis    Acute ST elevation myocardial infarction (STEMI) involving left anterior descending (LAD) coronary artery without development of Q waves ([address]    STEMI (ST elevation myocardial infarction) ([address] 18 PCI to DIAG     Chest pain    Coronary artery disease involving native coronary artery of native heart without angina pectoris    Shortness of breath    Essential hypertension    Tobacco abuse    Pure hypercholesterolemia         Cardiac Testing: I have reviewed the findings below. EKG:  ECHO:   STRESS TEST:  CATH:  BYPASS:  VASCULAR:    Past Medical History:   has a past medical history of CAD (coronary artery disease), Depression, Hyperlipidemia, and Hypertension. Surgical History:   has a past surgical history that includes  section; Hysterectomy; Dilation and curettage of uterus; Diagnostic Cardiac Cath Lab Procedure; Percutaneous Transluminal Coronary Angio; and Coronary angioplasty with stent (2018). Social History:   reports that she quit smoking about 2 years ago. Her smoking use included cigarettes. She smoked 0.25 packs per day.  She has never used ROS: negative for - hemoptysis or stridor  Cardiovascular ROS: negative for - chest pain, dyspnea on exertion or palpitations  Gastrointestinal ROS: no abdominal pain, change in bowel habits, or black or bloody stools  Genito-Urinary ROS: no dysuria, trouble voiding, or hematuria  Musculoskeletal ROS: negative for - gait disturbance, joint pain or joint stiffness  Neurological ROS: negative for - seizures or speech problems  Dermatological ROS: negative for - rash or skin lesion changes      Physical Examination:    BP 90/68   Pulse 84   Ht 5' 3\" (1.6 m)   Wt 174 lb (78.9 kg)   SpO2 97%   BMI 30.82 kg/m²   BP 90/68   Pulse 84   Ht 5' 3\" (1.6 m)   Wt 174 lb (78.9 kg)   SpO2 97%   BMI 30.82 kg/m²    Weight: 174 lb (78.9 kg)     Wt Readings from Last 3 Encounters:   11/30/20 174 lb (78.9 kg)   09/06/20 160 lb (72.6 kg)   12/21/19 160 lb (72.6 kg)     No intake or output data in the 24 hours ending 11/30/20 1305    General Appearance:  Alert, cooperative, no distress, appears stated age   Head:  Normocephalic, without obvious abnormality, atraumatic   Eyes:  PERRL, conjunctiva/corneas clear       Nose: Nares normal, no drainage or sinus tenderness   Throat: Lips, mucosa, and tongue normal   Neck: Supple, symmetrical, trachea midline, no adenopathy, thyroid: not enlarged, symmetric, no tenderness/mass/nodules, no carotid bruit or JVD       Lungs:   Clear to auscultation bilaterally, respirations unlabored   Chest Wall:  No tenderness or deformity   Heart:  Regular rhythm and normal rate; S1, S2 are normal; no murmur noted; no rub or gallop   Abdomen:   Soft, non-tender, bowel sounds active all four quadrants,  no masses, no organomegaly           Extremities: Extremities normal, atraumatic, no cyanosis or edema   Pulses: 2+ and symmetric   Skin: Skin color, texture, turgor normal, no rashes or lesions   Pysch: Normal mood and affect   Neurologic: Normal gross motor and sensory exam.         Labs  No results for input(s): WBC, HGB, HCT, MCV, PLT in the last 72 hours. No results for input(s): CREATININE, BUN, NA, K, CL, CO2 in the last 72 hours. No results for input(s): INR, PROTIME in the last 72 hours. No results for input(s): TROPONINI in the last 72 hours. Invalid input(s): PRO-BNP  No results for input(s): CHOL, HDL in the last 72 hours. Invalid input(s): LDL, TG      Imaging:  I have reviewed the below testing personally and my interpretation is below. EKG:  CXR:      Assessment:  50 y.o. patient with:  Problem List Items Addressed This Visit     Coronary artery disease involving native coronary artery of native heart without angina pectoris - Primary    Essential hypertension    Pure hypercholesterolemia          Plan:  1. Okay to stop Aspirin for 5 days prior to bladder procedure. 2. If you are unable to take Aspiring pending the results of your work up, we can change this to Plavix. 3. Stop Coreg as your blood pressure is low normal.   4. Monitor your blood pressure at home. If this becomes elevated in the 140's, call our office. 5. Follow up with me in 1 year. This note was scribed in the presence of Dr. Elveria Cheadle MD by Reid Sales RN.      I, Dr. Elveria Cheadle, personally performed the services described in this documentation, as scribed by the above signed scribe in my presence. It is both accurate and complete to my knowledge. I agree with the details independently gathered by the clinical support staff, while the remaining scribed note accurately describes my personal service to the patient. All questions and concerns were addressed to the patient/family. Alternatives to my treatment were discussed. The note was completed using EMR. Every effort was made to ensure accuracy; however, inadvertent computerized transcription errors may be present.     Elveria Cheadle, MD, [address], [place], [place]  [phone] [facility] office  [phone] [facility] central  11/30/2020  1:05 PM

## 2020-11-30 NOTE — PATIENT INSTRUCTIONS
Plan:  1. Okay to stop Aspirin for 5 days prior to bladder procedure. 2. If you are unable to take Aspiring pending the results of your work up, we can change this to Plavix. 3. Stop Coreg as your blood pressure is low normal.   4. Monitor your blood pressure at home. If this becomes elevated in the 140's, call our office. 5. Follow up with me in 1 year.

## 2020-12-02 ENCOUNTER — ANESTHESIA EVENT (OUTPATIENT)
Dept: OPERATING ROOM | Age: 45
End: 2020-12-02
Payer: COMMERCIAL

## 2020-12-02 NOTE — PROGRESS NOTES
PRE OP INSTRUCTION SHEET   1. Do not eat or drink anything after 12 midnight  prior to surgery. This includes no water, chewing gum or mints. 2. Take the following pills will a small sip of water (see MAR)                                        3. Aspirin, Ibuprofen, Advil, Naproxen, Vitamin E, fish oil and other Anti-inflammatory products should be stopped for 5 days before surgery or as directed by your physician. 4. Check with your Doctor regarding stopping Plavix, Coumadin, Lovenox, Fragmin or other blood thinners   5. Do not smoke, and do not drink any alcoholic beverages 24 hours prior to surgery. This includes NA Beer. 6. You may brush your teeth and gargle the morning of surgery. DO NOT SWALLOW WATER   7. You MUST make arrangements for a responsible adult to take you home after your surgery. You will not be allowed to leave alone or drive yourself home. It is strongly suggested someone stay with you the first 24 hrs. Your surgery will be cancelled if you do not have a ride home. 8. A parent/legal guardian must accompany a child scheduled for surgery and plan to stay at the hospital until the child is discharged. Please do not bring other children with you. 9. Please wear simple, loose fitting clothing to the hospital.  Ellie Him not bring valuables (money, credit cards, checkbooks, etc.) Do not wear any makeup (including no eye makeup) or nail polish on your fingers or toes. 10. DO NOT wear any jewelry or piercings on day of surgery. All body piercing jewelry must be removed. 11. If you have dentures,glasses, or contacts they will be removed before going to the OR; we will provide you a container. 12. Please see your family doctor/and cardiologist for a history & physical and/or concerning medications. Bring any test results/reports from your physician's office. Have history and labs faxed to [phone].  Remember to bring Blood Bank bracelet on the day of surgery. 14. If you have a Living Will and Durable Power of  for Healthcare, please bring in a copy. 13. Notify your Surgeon if you develop any illness between now and surgery  time, cough, cold, fever, sore throat, nausea, vomiting, etc.  Please notify your surgeon if you experience dizziness, shortness of breath or blurred vision between now & the time of your surgery   16. DO NOT shave your operative site 96 hours prior to surgery. For face & neck surgery, men may use an electric razor 48 hours prior to surgery. 17. Shower with _x__Antibacterial soap (x_chlorhexidine for total joint  Pt's) shower two times before surgery.(the morning of and the night before. 18. To provide excellent care visitors will be limited to one in the room at any given time.   Please call pre admission testing if you any further questions [phone] or [phone]

## 2020-12-03 ENCOUNTER — HOSPITAL ENCOUNTER (OUTPATIENT)
Age: 45
Discharge: HOME | End: 2020-12-03
Payer: COMMERCIAL

## 2020-12-03 PROCEDURE — U0003 INFECTIOUS AGENT DETECTION BY NUCLEIC ACID (DNA OR RNA); SEVERE ACUTE RESPIRATORY SYNDROME CORONAVIRUS 2 (SARS-COV-2) (CORONAVIRUS DISEASE [COVID-19]), AMPLIFIED PROBE TECHNIQUE, MAKING USE OF HIGH THROUGHPUT TECHNOLOGIES AS DESCRIBED BY CMS-2020-01-R: HCPCS

## 2020-12-04 LAB — SARS-COV-2: NOT DETECTED

## 2020-12-07 ENCOUNTER — ANESTHESIA (OUTPATIENT)
Dept: OPERATING ROOM | Age: 45
End: 2020-12-07
Payer: COMMERCIAL

## 2020-12-07 ENCOUNTER — HOSPITAL ENCOUNTER (OUTPATIENT)
Age: 45
Setting detail: OUTPATIENT SURGERY
Discharge: HOME | End: 2020-12-07
Attending: UROLOGY | Admitting: UROLOGY
Payer: COMMERCIAL

## 2020-12-07 VITALS — SYSTOLIC BLOOD PRESSURE: 100 MMHG | OXYGEN SATURATION: 99 % | DIASTOLIC BLOOD PRESSURE: 69 MMHG

## 2020-12-07 VITALS
DIASTOLIC BLOOD PRESSURE: 84 MMHG | SYSTOLIC BLOOD PRESSURE: 129 MMHG | WEIGHT: 170 LBS | TEMPERATURE: 97.3 F | HEART RATE: 85 BPM | HEIGHT: 64 IN | BODY MASS INDEX: 29.02 KG/M2 | RESPIRATION RATE: 12 BRPM | OXYGEN SATURATION: 99 %

## 2020-12-07 PROCEDURE — 7100000011 HC PHASE II RECOVERY - ADDTL 15 MIN: Performed by: UROLOGY

## 2020-12-07 PROCEDURE — 6360000002 HC RX W HCPCS: Performed by: UROLOGY

## 2020-12-07 PROCEDURE — 7100000010 HC PHASE II RECOVERY - FIRST 15 MIN: Performed by: UROLOGY

## 2020-12-07 PROCEDURE — 2709999900 HC NON-CHARGEABLE SUPPLY: Performed by: UROLOGY

## 2020-12-07 PROCEDURE — 3600000014 HC SURGERY LEVEL 4 ADDTL 15MIN: Performed by: UROLOGY

## 2020-12-07 PROCEDURE — 3700000001 HC ADD 15 MINUTES (ANESTHESIA): Performed by: UROLOGY

## 2020-12-07 PROCEDURE — 3700000000 HC ANESTHESIA ATTENDED CARE: Performed by: UROLOGY

## 2020-12-07 PROCEDURE — 3600000004 HC SURGERY LEVEL 4 BASE: Performed by: UROLOGY

## 2020-12-07 PROCEDURE — 6360000002 HC RX W HCPCS

## 2020-12-07 PROCEDURE — 2580000003 HC RX 258: Performed by: ANESTHESIOLOGY

## 2020-12-07 PROCEDURE — 2500000003 HC RX 250 WO HCPCS: Performed by: ANESTHESIOLOGY

## 2020-12-07 PROCEDURE — 6370000000 HC RX 637 (ALT 250 FOR IP): Performed by: UROLOGY

## 2020-12-07 PROCEDURE — 2580000003 HC RX 258: Performed by: UROLOGY

## 2020-12-07 PROCEDURE — 2500000003 HC RX 250 WO HCPCS

## 2020-12-07 RX ORDER — SODIUM CHLORIDE, SODIUM LACTATE, POTASSIUM CHLORIDE, CALCIUM CHLORIDE 600; 310; 30; 20 MG/100ML; MG/100ML; MG/100ML; MG/100ML
INJECTION, SOLUTION INTRAVENOUS CONTINUOUS
Status: DISCONTINUED | OUTPATIENT
Start: 2020-12-07 | End: 2020-12-07 | Stop reason: HOSPADM

## 2020-12-07 RX ORDER — MORPHINE SULFATE 10 MG/ML
2 INJECTION, SOLUTION INTRAMUSCULAR; INTRAVENOUS EVERY 5 MIN PRN
Status: DISCONTINUED | OUTPATIENT
Start: 2020-12-07 | End: 2020-12-07 | Stop reason: HOSPADM

## 2020-12-07 RX ORDER — LIDOCAINE HYDROCHLORIDE 20 MG/ML
INJECTION, SOLUTION INFILTRATION; PERINEURAL PRN
Status: DISCONTINUED | OUTPATIENT
Start: 2020-12-07 | End: 2020-12-07 | Stop reason: SDUPTHER

## 2020-12-07 RX ORDER — PHENAZOPYRIDINE HYDROCHLORIDE 200 MG/1
200 TABLET, FILM COATED ORAL 3 TIMES DAILY PRN
Qty: 15 TABLET | Refills: 0 | Status: SHIPPED | OUTPATIENT
Start: 2020-12-07 | End: 2020-12-12

## 2020-12-07 RX ORDER — ONDANSETRON 2 MG/ML
INJECTION INTRAMUSCULAR; INTRAVENOUS PRN
Status: DISCONTINUED | OUTPATIENT
Start: 2020-12-07 | End: 2020-12-07 | Stop reason: SDUPTHER

## 2020-12-07 RX ORDER — MIDAZOLAM HYDROCHLORIDE 1 MG/ML
INJECTION INTRAMUSCULAR; INTRAVENOUS PRN
Status: DISCONTINUED | OUTPATIENT
Start: 2020-12-07 | End: 2020-12-07 | Stop reason: SDUPTHER

## 2020-12-07 RX ORDER — SODIUM CHLORIDE 0.9 % (FLUSH) 0.9 %
10 SYRINGE (ML) INJECTION PRN
Status: DISCONTINUED | OUTPATIENT
Start: 2020-12-07 | End: 2020-12-07 | Stop reason: HOSPADM

## 2020-12-07 RX ORDER — SODIUM CHLORIDE 0.9 % (FLUSH) 0.9 %
10 SYRINGE (ML) INJECTION EVERY 12 HOURS SCHEDULED
Status: DISCONTINUED | OUTPATIENT
Start: 2020-12-07 | End: 2020-12-07 | Stop reason: HOSPADM

## 2020-12-07 RX ORDER — LIDOCAINE HYDROCHLORIDE 20 MG/ML
JELLY TOPICAL PRN
Status: DISCONTINUED | OUTPATIENT
Start: 2020-12-07 | End: 2020-12-07 | Stop reason: ALTCHOICE

## 2020-12-07 RX ORDER — PROPOFOL 10 MG/ML
INJECTION, EMULSION INTRAVENOUS PRN
Status: DISCONTINUED | OUTPATIENT
Start: 2020-12-07 | End: 2020-12-07 | Stop reason: SDUPTHER

## 2020-12-07 RX ORDER — MAGNESIUM HYDROXIDE 1200 MG/15ML
LIQUID ORAL PRN
Status: DISCONTINUED | OUTPATIENT
Start: 2020-12-07 | End: 2020-12-07 | Stop reason: ALTCHOICE

## 2020-12-07 RX ORDER — OXYCODONE HYDROCHLORIDE AND ACETAMINOPHEN 5; 325 MG/1; MG/1
2 TABLET ORAL PRN
Status: DISCONTINUED | OUTPATIENT
Start: 2020-12-07 | End: 2020-12-07 | Stop reason: HOSPADM

## 2020-12-07 RX ORDER — DEXAMETHASONE SODIUM PHOSPHATE 4 MG/ML
INJECTION, SOLUTION INTRA-ARTICULAR; INTRALESIONAL; INTRAMUSCULAR; INTRAVENOUS; SOFT TISSUE PRN
Status: DISCONTINUED | OUTPATIENT
Start: 2020-12-07 | End: 2020-12-07 | Stop reason: SDUPTHER

## 2020-12-07 RX ORDER — OXYCODONE HYDROCHLORIDE AND ACETAMINOPHEN 5; 325 MG/1; MG/1
1 TABLET ORAL PRN
Status: DISCONTINUED | OUTPATIENT
Start: 2020-12-07 | End: 2020-12-07 | Stop reason: HOSPADM

## 2020-12-07 RX ORDER — ONDANSETRON 2 MG/ML
4 INJECTION INTRAMUSCULAR; INTRAVENOUS
Status: DISCONTINUED | OUTPATIENT
Start: 2020-12-07 | End: 2020-12-07 | Stop reason: HOSPADM

## 2020-12-07 RX ORDER — CEPHALEXIN 250 MG/1
250 CAPSULE ORAL 2 TIMES DAILY
Qty: 10 CAPSULE | Refills: 0 | Status: SHIPPED | OUTPATIENT
Start: 2020-12-07 | End: 2020-12-12

## 2020-12-07 RX ORDER — MORPHINE SULFATE 10 MG/ML
1 INJECTION, SOLUTION INTRAMUSCULAR; INTRAVENOUS EVERY 5 MIN PRN
Status: DISCONTINUED | OUTPATIENT
Start: 2020-12-07 | End: 2020-12-07 | Stop reason: HOSPADM

## 2020-12-07 RX ORDER — MEPERIDINE HYDROCHLORIDE 25 MG/ML
12.5 INJECTION INTRAMUSCULAR; INTRAVENOUS; SUBCUTANEOUS EVERY 5 MIN PRN
Status: DISCONTINUED | OUTPATIENT
Start: 2020-12-07 | End: 2020-12-07 | Stop reason: HOSPADM

## 2020-12-07 RX ORDER — FENTANYL CITRATE 50 UG/ML
INJECTION, SOLUTION INTRAMUSCULAR; INTRAVENOUS PRN
Status: DISCONTINUED | OUTPATIENT
Start: 2020-12-07 | End: 2020-12-07 | Stop reason: SDUPTHER

## 2020-12-07 RX ADMIN — MIDAZOLAM 2 MG: 1 INJECTION INTRAMUSCULAR; INTRAVENOUS at 13:27

## 2020-12-07 RX ADMIN — FAMOTIDINE 20 MG: 10 INJECTION, SOLUTION INTRAVENOUS at 12:05

## 2020-12-07 RX ADMIN — FENTANYL CITRATE 50 MCG: 50 INJECTION INTRAMUSCULAR; INTRAVENOUS at 13:32

## 2020-12-07 RX ADMIN — FENTANYL CITRATE 50 MCG: 50 INJECTION INTRAMUSCULAR; INTRAVENOUS at 13:27

## 2020-12-07 RX ADMIN — SODIUM CHLORIDE, SODIUM LACTATE, POTASSIUM CHLORIDE, AND CALCIUM CHLORIDE: .6; .31; .03; .02 INJECTION, SOLUTION INTRAVENOUS at 12:05

## 2020-12-07 RX ADMIN — PROPOFOL 120 MG: 10 INJECTION, EMULSION INTRAVENOUS at 13:33

## 2020-12-07 RX ADMIN — LIDOCAINE HYDROCHLORIDE 100 MG: 20 INJECTION, SOLUTION INFILTRATION; PERINEURAL at 13:27

## 2020-12-07 RX ADMIN — DEXAMETHASONE SODIUM PHOSPHATE 10 MG: 4 INJECTION, SOLUTION INTRAMUSCULAR; INTRAVENOUS at 13:37

## 2020-12-07 RX ADMIN — Medication 2 G: at 13:12

## 2020-12-07 RX ADMIN — ONDANSETRON 4 MG: 2 INJECTION, SOLUTION INTRAMUSCULAR; INTRAVENOUS at 13:27

## 2020-12-07 ASSESSMENT — PAIN - FUNCTIONAL ASSESSMENT: PAIN_FUNCTIONAL_ASSESSMENT: 0-10

## 2020-12-07 ASSESSMENT — ENCOUNTER SYMPTOMS: SHORTNESS OF BREATH: 0

## 2020-12-07 ASSESSMENT — PULMONARY FUNCTION TESTS
PIF_VALUE: 18
PIF_VALUE: 19
PIF_VALUE: 8
PIF_VALUE: 0
PIF_VALUE: 0
PIF_VALUE: 1
PIF_VALUE: 9
PIF_VALUE: 16
PIF_VALUE: 1
PIF_VALUE: 4
PIF_VALUE: 0
PIF_VALUE: 1
PIF_VALUE: 1
PIF_VALUE: 5
PIF_VALUE: 9
PIF_VALUE: 0
PIF_VALUE: 16
PIF_VALUE: 9
PIF_VALUE: 6
PIF_VALUE: 0
PIF_VALUE: 18

## 2020-12-07 ASSESSMENT — LIFESTYLE VARIABLES: SMOKING_STATUS: 0

## 2020-12-07 NOTE — PROGRESS NOTES
NO CHANGE IN PHYSICAL ASSESSMENT; PT AND FAMILY VERBALIZE UNDERSTANDING OF INSTRUCTIONS; PT DISCHARGED VIA W/C BY NURSE WITH FAMILY IN STABLE CONDITION; PT HAS NO C/O

## 2020-12-07 NOTE — ANESTHESIA POSTPROCEDURE EVALUATION
Department of Anesthesiology  Postprocedure Note    Patient: Siria Ann  MRN: [id number]  YOB: 1975  Date of evaluation: 12/7/2020  Time:  2:49 PM     Procedure Summary     Date:  12/07/20 Room / Location:  [place] [address] / [facility]    Anesthesia Start:  8338 Anesthesia Stop:  2746    Procedure:  CYSTOSCOPY, URETHRAL DILATATION (N/A Bladder) Diagnosis:  (HEMATURIA)    Surgeon:  Ilya Tomlinson MD Responsible Provider:  Leona Lindquist MD    Anesthesia Type:  general ASA Status:  2          Anesthesia Type: general    Nereida Phase I: Nereida Score: 10    Nereida Phase II: Nereida Score: 10    Last vitals: Reviewed and per EMR flowsheets.      Vitals:    12/07/20 1151 12/07/20 1347 12/07/20 1400 12/07/20 1415   BP: 127/85 (!) 90/59 105/70 129/84   Pulse: 88 81 82 85   Resp: 20 10 12 12   Temp: 98.8 °F (37.1 °C) 97.3 °F (36.3 °C)     TempSrc: Temporal Temporal     SpO2: 95% 100% 100% 99%   Weight:       Height:         Anesthesia Post Evaluation    Patient location during evaluation: bedside  Patient participation: complete - patient participated  Level of consciousness: awake and alert  Airway patency: patent  Complications: no  Cardiovascular status: hemodynamically stable  Respiratory status: acceptable  Hydration status: euvolemic

## 2020-12-07 NOTE — ANESTHESIA PRE PROCEDURE
Department of Anesthesiology  Preprocedure Note       Name:  Florinda Scott   Age:  50 y.o.  :  1975                                          MRN:  [id number]         Date:  2020      Surgeon: Lori Bluont):  Elaina Almanza MD    Procedure: Procedure(s):  CYSTOSCOPY, POSSIBLE BLADDER BIOPSY, POSSIBLE URETHRAL DILATATION    Medications prior to admission:   Prior to Admission medications    Medication Sig Start Date End Date Taking? Authorizing Provider   Levocetirizine Dihydrochloride (XYZAL PO) Take by mouth   Yes Historical Provider, MD   Fluticasone Propionate (FLONASE NA) by Nasal route   Yes Historical Provider, MD   rosuvastatin (CRESTOR) 40 MG tablet TAKE 1 TABLET EVERY EVENING 20  Yes Artie Becker MD   [employer] ASPIRIN ADULT LOW DOSE 81 MG chewable tablet TAKE 1 TABLET BY MOUTH EVERY DAY 18  Yes Artie Becker MD       Current medications:    Current Facility-Administered Medications   Medication Dose Route Frequency Provider Last Rate Last Dose    ceFAZolin (ANCEF) 2 g in sterile water 20 mL IV syringe  2 g Intravenous Once Elaina Almanza MD        lactated ringers infusion   Intravenous Continuous Santa Amador MD        sodium chloride flush 0.9 % injection 10 mL  10 mL Intravenous 2 times per day Santa Amadro MD        sodium chloride flush 0.9 % injection 10 mL  10 mL Intravenous PRN Santa Amador MD        famotidine (PEPCID) injection 20 mg  20 mg Intravenous Once Santa Amador MD           Allergies:     Allergies   Allergen Reactions    Lisinopril      cough    Sulfa Antibiotics        Problem List:    Patient Active Problem List   Diagnosis Code    Acute ST elevation myocardial infarction (STEMI) involving left anterior descending (LAD) coronary artery without development of Q waves ([facility]) I21.02    STEMI (ST elevation myocardial infarction) ([facility] 18 PCI to DIAG  I21.3    Chest pain R07.9    Coronary artery disease involving native coronary artery of native heart without angina pectoris I25.10    Shortness of breath R06.02    Essential hypertension I10    Tobacco abuse Z72.0    Pure hypercholesterolemia E78.00       Past Medical History:        Diagnosis Date    CAD (coronary artery disease)     Depression     Hyperlipidemia     Hypertension        Past Surgical History:        Procedure Laterality Date     SECTION      CORONARY ANGIOPLASTY WITH STENT PLACEMENT  2018    DIAGNOSTIC CARDIAC CATH LAB PROCEDURE      DILATION AND CURETTAGE OF UTERUS      HYSTERECTOMY      PTCA         Social History:    Social History     Tobacco Use    Smoking status: Former Smoker     Packs/day: 0.25     Types: Cigarettes     Last attempt to quit: 2018     Years since quittin.0    Smokeless tobacco: Never Used   Substance Use Topics    Alcohol use: Yes     Alcohol/week: 1.0 standard drinks     Types: 1 Glasses of wine per week     Comment: socially                                Counseling given: Not Answered      Vital Signs (Current):   Vitals:    20 1528   Weight: 170 lb (77.1 kg)   Height: 5' 4\" (1.626 m)                                              BP Readings from Last 3 Encounters:   20 90/68   20 134/80   19 110/84       NPO Status:  MN+, SEE MAR                                                                               BMI:   Wt Readings from Last 3 Encounters:   20 170 lb (77.1 kg)   20 174 lb (78.9 kg)   20 160 lb (72.6 kg)     Body mass index is 29.18 kg/m².     CBC:   Lab Results   Component Value Date    WBC 8.1 2020    RBC 4.82 2020    HGB 15.2 2020    HCT 44.8 2020    MCV 92.9 2020    RDW 12.9 2020     2020       CMP:   Lab Results   Component Value Date     2020    K 3.7 2020     2020    CO2 23 2020    BUN 10 2020    CREATININE 0.7 hyperthyroidism, blood dyscrasia, no malignancy/cancer               Abdominal:           Vascular: negative vascular ROS. Anesthesia Plan      general     ASA 2       Induction: intravenous. MIPS: Postoperative opioids intended and Prophylactic antiemetics administered. Anesthetic plan and risks discussed with patient. Plan discussed with CRNA. This pre-anesthesia assessment may be used as a history and physical.    DOS STAFF ADDENDUM:    Pt seen and examined, chart reviewed (including anesthesia, drug and allergy history). No interval changes to history and physical examination. Anesthetic plan, risks, benefits, alternatives, and personnel involved discussed with patient. Patient verbalized an understanding and agrees to proceed.       Tatum Camp MD  December 7, 2020  11:51 AM      Tatum Camp MD   12/7/2020

## 2020-12-07 NOTE — H&P
Dr. Doug Sibley Same Day Surgery H&P     2020  Macrina Lo    Reason for Surgery:  Cystitis, voiding difficulty  Requesting Physician:  ER/Fam       History Obtained From:  patient, electronic medical record    HISTORY OF PRESENT ILLNESS:                The patient is a 50 y.o. female who presents with cystitis. I am taking the patient to surgery for evaluation and care of this problem. Past Medical History:        Diagnosis Date    CAD (coronary artery disease)     Depression     Hyperlipidemia     Hypertension      Past Surgical History:        Procedure Laterality Date     SECTION      CORONARY ANGIOPLASTY WITH STENT PLACEMENT  2018    DIAGNOSTIC CARDIAC CATH LAB PROCEDURE      DILATION AND CURETTAGE OF UTERUS      HYSTERECTOMY      PTCA       Current Medications:   Prior to Admission medications    Medication Sig Start Date End Date Taking? Authorizing Provider   Levocetirizine Dihydrochloride (XYZAL PO) Take by mouth   Yes Historical Provider, MD   Fluticasone Propionate (FLONASE NA) by Nasal route   Yes Historical Provider, MD   rosuvastatin (CRESTOR) 40 MG tablet TAKE 1 TABLET EVERY EVENING 20  Yes Norm Ochoa MD   [facility] ASPIRIN ADULT LOW DOSE 81 MG chewable tablet TAKE 1 TABLET BY MOUTH EVERY DAY 18  Yes Norm Ochoa MD     Allergies: Allergies   Allergen Reactions    Lisinopril      cough    Sulfa Antibiotics      Social History:  Reviewed, non contributory    Family History:  Reviewed, non contributory      REVIEW OF SYSTEMS:    12 point ROS was already completed and this has been reviewed. The pertinent positive findings include LUTs.     PHYSICAL EXAM:    VITALS:  /85   Pulse 88   Temp 98.8 °F (37.1 °C) (Temporal)   Resp 20   Ht 5' 4\" (1.626 m)   Wt 170 lb (77.1 kg)   SpO2 95%   BMI 29.18 kg/m²   H&N: Sclera normal, no masses, trachea midline, no bruit  CVS: Normal rate and rhythm, no murmurs or rubs, peripheral pulses equal, no clubbing or cyanosis. RESP: Breath sounds equal bilateral, few rhonchi. ABDO: Soft, non-tender, bowel sounds active, no organomegaly, no hernias. LYMPH:  No lymphadenopathy. Skin: Warm dry and intact. : No CVAT, normal external genitalia, no discharge. MSK: Grossly normal for patient  TIMO: Grossly normal for patient  PSY: No acute changes noted in psychosocial assessment. DATA:    CBC:   Lab Results   Component Value Date    WBC 8.1 09/06/2020    RBC 4.82 09/06/2020    HGB 15.2 09/06/2020    HCT 44.8 09/06/2020    MCV 92.9 09/06/2020    MCH 31.6 09/06/2020    MCHC 34.0 09/06/2020    RDW 12.9 09/06/2020     09/06/2020    MPV 8.9 09/06/2020     BMP:    Lab Results   Component Value Date     09/06/2020    K 3.7 09/06/2020     09/06/2020    CO2 23 09/06/2020    BUN 10 09/06/2020    LABALBU 4.2 09/06/2020    CREATININE 0.7 09/06/2020    CALCIUM 9.3 09/06/2020    GFRAA >60 09/06/2020    LABGLOM >60 09/06/2020    GLUCOSE 116 09/06/2020     U/A:    Lab Results   Component Value Date    COLORU Yellow 09/06/2020    PROTEINU Negative 09/06/2020    PHUR 5.0 09/06/2020    WBCUA 0-2 09/06/2020    RBCUA 0-2 09/06/2020    MUCUS 2+ 12/21/2019    BACTERIA 2+ 12/21/2019    CLARITYU Clear 09/06/2020    SPECGRAV 1.010 09/06/2020    LEUKOCYTESUR Negative 09/06/2020    UROBILINOGEN 0.2 09/06/2020    BILIRUBINUR Negative 09/06/2020    BLOODU SMALL 09/06/2020    GLUCOSEU Negative 09/06/2020    AMORPHOUS 2+ 04/26/2016       IMPRESSION/RECOMMENDATIONS:   Patient will be taken to surgery for definitive care for the presenting problem(s). The patient has been informed of the goals, risks and benefits of the procedure. Informed consent has been obtained and all of the patient's questions were answered to their satisfaction. The patient wishes to proceed with the planned surgery.       Jasmin Hurst M.D.

## 2020-12-07 NOTE — BRIEF OP NOTE
Brief Postoperative Note      Patient: Severa Johann  YOB: 1975  MRN: [id number]    Date of Procedure: 12/7/2020    Pre-Op Diagnosis: HEMATURIA    Post-Op Diagnosis: Same       Procedure(s):  CYSTOSCOPY, URETHRAL DILATATION    Surgeon(s):  Tiny Givens MD    Assistant:  * No surgical staff found *    Anesthesia: General    Estimated Blood Loss (mL): Minimal    Complications: None    Specimens:   * No specimens in log *    Implants:  * No implants in log *      Drains: * No LDAs found *    Findings: Stenosis    Electronically signed by Sheba Obrien MD on 12/7/2020 at 1:48 PM

## 2020-12-14 NOTE — OP NOTE
[address]                 [address] [facility] [address]                                OPERATIVE REPORT    PATIENT NAME: Cornel Santillan                  :        1975  MED REC NO:   [id number]                          ROOM:  ACCOUNT NO:   [de-identified]                           ADMIT DATE: 2020  PROVIDER:     Trish Hinds MD    DATE OF PROCEDURE:  2020    PREOPERATIVE DIAGNOSIS:  Voiding difficulty with chronic cystitis. POSTOPERATIVE DIAGNOSES:  Voiding difficulty with chronic cystitis,  urethral stenosis. OPERATION PERFORMED:  Cystoscopy with urethral dilation. PRIMARY SURGEON:  Trish Hinds MD.    ANESTHESIA:  General.    OPERATIVE FINDINGS:  1. Urethral stenosis. 2.  No evidence for bladder cancer, tumors, or stones. ESTIMATED BLOOD LOSS:  5 mL. HISTORY AND INDICATIONS:  A 68-year-old white female with a history of  chronic cystitis and voiding difficulty. To rule out bladder pathology,  she was scheduled for cystourethroscopy. Risks, benefits, and expected  outcomes of the procedure have been discussed. DETAILS OF THE PROCEDURE:  After obtaining informed consent, the patient  was taken to the operative suite. She was given a general anesthetic  and placed on the operative table in a modified dorsal lithotomy  position. Prepping and draping was done in sterile fashion. Cystourethroscopy was then performed with both 30- and 70-degree lenses. Both ureteral orifices were orthotopic with clear efflux of urine. There was no evidence for any bladder cancer, tumors, or stones. Stenosis of the patient's urethra and bladder neck area, which had been  encountered, was subsequently dilated with Esequiel sounds to 34-Tunisian. She tolerated the procedure well. After draining her bladder, lidocaine  jelly was applied and she was taken back to the postop recovery room in  stable condition. Elizabeth Molina MD    D: 12/13/2020 20:48:32       T: 12/13/2020 20:50:44     [id number]  Job#: [id number]     Doc#: [id number]    CC:

## 2021-01-05 RX ORDER — CARVEDILOL 3.12 MG/1
3.12 TABLET ORAL 2 TIMES DAILY
Qty: 180 TABLET | Refills: 3 | Status: SHIPPED | OUTPATIENT
Start: 2021-01-05 | End: 2021-01-26 | Stop reason: SDUPTHER

## 2021-01-05 NOTE — TELEPHONE ENCOUNTER
Patient called stating her BP has been running high. Patient states BP this morning was 133/99 P100.  Patient states dizziness, headache and nausea

## 2021-01-26 DIAGNOSIS — E78.00 PURE HYPERCHOLESTEROLEMIA: ICD-10-CM

## 2021-01-26 RX ORDER — ROSUVASTATIN CALCIUM 40 MG/1
TABLET, COATED ORAL
Qty: 90 TABLET | Refills: 1 | Status: SHIPPED | OUTPATIENT
Start: 2021-01-26 | End: 2021-07-02

## 2021-01-26 RX ORDER — CARVEDILOL 3.12 MG/1
3.12 TABLET ORAL 2 TIMES DAILY
Qty: 180 TABLET | Refills: 1 | Status: SHIPPED | OUTPATIENT
Start: 2021-01-26 | End: 2021-07-02

## 2021-07-01 DIAGNOSIS — E78.00 PURE HYPERCHOLESTEROLEMIA: ICD-10-CM

## 2021-07-02 RX ORDER — ROSUVASTATIN CALCIUM 40 MG/1
TABLET, COATED ORAL
Qty: 90 TABLET | Refills: 3 | Status: SHIPPED | OUTPATIENT
Start: 2021-07-02 | End: 2022-07-01

## 2021-07-02 RX ORDER — CARVEDILOL 3.12 MG/1
TABLET ORAL
Qty: 180 TABLET | Refills: 3 | Status: SHIPPED | OUTPATIENT
Start: 2021-07-02 | End: 2022-08-29 | Stop reason: SDUPTHER

## 2021-12-03 ENCOUNTER — OFFICE VISIT (OUTPATIENT)
Dept: CARDIOLOGY CLINIC | Age: 46
End: 2021-12-03
Payer: COMMERCIAL

## 2021-12-03 ENCOUNTER — HOSPITAL ENCOUNTER (OUTPATIENT)
Dept: MAMMOGRAPHY | Age: 46
Discharge: HOME | End: 2021-12-03
Payer: COMMERCIAL

## 2021-12-03 VITALS
DIASTOLIC BLOOD PRESSURE: 72 MMHG | WEIGHT: 167 LBS | SYSTOLIC BLOOD PRESSURE: 130 MMHG | HEART RATE: 82 BPM | HEIGHT: 64 IN | BODY MASS INDEX: 28.51 KG/M2 | OXYGEN SATURATION: 97 %

## 2021-12-03 DIAGNOSIS — I10 ESSENTIAL HYPERTENSION: ICD-10-CM

## 2021-12-03 DIAGNOSIS — Z12.31 SCREENING MAMMOGRAM FOR BREAST CANCER: ICD-10-CM

## 2021-12-03 DIAGNOSIS — I25.10 CORONARY ARTERY DISEASE INVOLVING NATIVE CORONARY ARTERY OF NATIVE HEART WITHOUT ANGINA PECTORIS: Primary | ICD-10-CM

## 2021-12-03 PROCEDURE — 99214 OFFICE O/P EST MOD 30 MIN: CPT | Performed by: INTERNAL MEDICINE

## 2021-12-03 PROCEDURE — 77063 BREAST TOMOSYNTHESIS BI: CPT

## 2021-12-03 RX ORDER — FLUOXETINE HYDROCHLORIDE 20 MG/1
40 CAPSULE ORAL DAILY
COMMUNITY

## 2021-12-03 NOTE — PROGRESS NOTES
[address] [facility]   Cardiovascular Evaluation    PATIENT: Jelani Gardner  DATE: 12/3/2021  MRN: <[id number]>  CSN: [id number]  : 1975    Primary Care Doctor: DAVID Nash CNP    Reason for evaluation/Chief complaint:   1 Year Follow Up, Coronary Artery Disease, and Hypertension    Subjective:    History of present illness on initial date of evaluation:   Jelani Gardner is a 48 y.o. patient who presents for cardiology follow up. She has a past medical history of coronary artery disease status post percutaneous coronary intervention to first diagonal with drug-eluting stent 2018. She also has a history of HTN and HLD. Today she is feeling well from a cardiac standpoint. She is feeling more stressed lately. She is here for one year follow up. Patient is taking all cardiac medications as prescribed and tolerates them well. Patient denies chest pain, sob, palpitations, dizziness or syncope. Patient Active Problem List   Diagnosis    Acute ST elevation myocardial infarction (STEMI) involving left anterior descending (LAD) coronary artery without development of Q waves ([address]    STEMI (ST elevation myocardial infarction) ([address] 18 PCI to DIAG     Chest pain    Coronary artery disease involving native coronary artery of native heart without angina pectoris    Shortness of breath    Essential hypertension    Tobacco abuse    Pure hypercholesterolemia         Cardiac Testing: I have reviewed the findings below. EKG:  ECHO:   STRESS TEST:  CATH:  BYPASS:  VASCULAR:    Past Medical History:   has a past medical history of CAD (coronary artery disease), Depression, Hyperlipidemia, and Hypertension. Surgical History:   has a past surgical history that includes  section; Hysterectomy; Dilation and curettage of uterus; Diagnostic Cardiac Cath Lab Procedure; Percutaneous Transluminal Coronary Angio; Coronary angioplasty with stent (2018);  Cystoscopy (N/A, 12/07/2020); and Cystoscopy (N/A, 12/7/2020). Social History:   reports that she quit smoking about 3 years ago. Her smoking use included cigarettes. She smoked 0.25 packs per day. She has never used smokeless tobacco. She reports current alcohol use of about 1.0 standard drink of alcohol per week. She reports that she does not use drugs. Family History:  No evidence for sudden cardiac death or premature CAD    Medications:  Reviewed and are listed in nursing record. and/or listed below  Outpatient Medications:  Prior to Admission medications    Medication Sig Start Date End Date Taking? Authorizing Provider   FLUoxetine (PROZAC) 20 MG capsule Take 40 mg by mouth daily   Yes Historical Provider, MD   carvedilol (COREG) 3.125 MG tablet TAKE 1 TABLET TWICE A DAY 7/2/21  Yes Bernadine Chapin MD   rosuvastatin (CRESTOR) 40 MG tablet TAKE 1 TABLET DAILY 7/2/21  Yes Bernadine Chapin MD   Levocetirizine Dihydrochloride (XYZAL PO) Take by mouth   Yes Historical Provider, MD   Fluticasone Propionate (FLONASE NA) by Nasal route   Yes Historical Provider, MD   [employer]S ASPIRIN ADULT LOW DOSE 81 MG chewable tablet TAKE 1 TABLET BY MOUTH EVERY DAY 11/12/18  Yes Bernadine Chapin MD       In-patient schedule medications:        Infusion Medications: Allergies:  Lisinopril and Sulfa antibiotics     Review of Systems:   All 14 point review of symptoms completed. Pertinent positives identified in the HPI, all other review of symptoms findings as below.      Review of Systems - History obtained from the patient  General ROS: negative for - chills, fever or night sweats  Psychological ROS: negative for - disorientation or hallucinations  Ophthalmic ROS: negative for - dry eyes, eye pain or loss of vision  ENT ROS: negative for - nasal discharge or sore throat  Allergy and Immunology ROS: negative for - hives or itchy/watery eyes  Hematological and Lymphatic ROS: negative for - jaundice or night sweats  Endocrine ROS: negative for - mood swings or temperature intolerance  Breast ROS: deferred  Respiratory ROS: negative for - hemoptysis or stridor  Cardiovascular ROS: negative for - chest pain, dyspnea on exertion or palpitations  Gastrointestinal ROS: no abdominal pain, change in bowel habits, or black or bloody stools  Genito-Urinary ROS: no dysuria, trouble voiding, or hematuria  Musculoskeletal ROS: negative for - gait disturbance, joint pain or joint stiffness  Neurological ROS: negative for - seizures or speech problems  Dermatological ROS: negative for - rash or skin lesion changes      Physical Examination:    /72 (Site: Right Upper Arm)   Pulse 82   Ht 5' 4\" (1.626 m)   Wt 167 lb (75.8 kg)   SpO2 97%   BMI 28.67 kg/m²   /72 (Site: Right Upper Arm)   Pulse 82   Ht 5' 4\" (1.626 m)   Wt 167 lb (75.8 kg)   SpO2 97%   BMI 28.67 kg/m²    Weight: 167 lb (75.8 kg)     Wt Readings from Last 3 Encounters:   12/03/21 167 lb (75.8 kg)   12/02/20 170 lb (77.1 kg)   11/30/20 174 lb (78.9 kg)     No intake or output data in the 24 hours ending 12/03/21 1338    General Appearance:  Alert, cooperative, no distress, appears stated age   Head:  Normocephalic, without obvious abnormality, atraumatic   Eyes:  PERRL, conjunctiva/corneas clear       Nose: Nares normal, no drainage or sinus tenderness   Throat: Lips, mucosa, and tongue normal   Neck: Supple, symmetrical, trachea midline, no adenopathy, thyroid: not enlarged, symmetric, no tenderness/mass/nodules, no carotid bruit or JVD       Lungs:   Clear to auscultation bilaterally, respirations unlabored   Chest Wall:  No tenderness or deformity   Heart:  Regular rhythm and normal rate; S1, S2 are normal; no murmur noted; no rub or gallop   Abdomen:   Soft, non-tender, bowel sounds active all four quadrants,  no masses, no organomegaly           Extremities: Extremities normal, atraumatic, no cyanosis or edema   Pulses: 2+ and symmetric   Skin: Skin color, texture, turgor normal, no rashes or lesions   Pysch: Normal mood and affect   Neurologic: Normal gross motor and sensory exam.         Labs  No results for input(s): WBC, HGB, HCT, MCV, PLT in the last 72 hours. No results for input(s): CREATININE, BUN, NA, K, CL, CO2 in the last 72 hours. No results for input(s): INR, PROTIME in the last 72 hours. No results for input(s): TROPONINI in the last 72 hours. Invalid input(s): PRO-BNP  No results for input(s): CHOL, HDL in the last 72 hours. Invalid input(s): LDL, TG      Imaging:  I have reviewed the below testing personally and my interpretation is below. EKG 12/2019:  BPM    CXR:    ECHO 7/2018:   Normal left ventricular systolic function with ejection fraction of 65%. Mild concentric left ventricular hypertrophy. Grade I diastolic dysfunction with normal filling pressure. Trivial mitral regurgitation    [facility] 7/2018:  PROCEDURES PERFORMED:  1. Selective coronary angiography. 2.  Left heart catheterization. 3.  Left ventriculography. 4.  Percutaneous coronary intervention of the first diagonal with an [place]  drug-eluting stent. 5.  Angio-Seal device deployment. 6.  Critical care time of 40 minutes. ANGIOGRAPHY FINDINGS:  1.  A 99% stenosis of the first diagonal branch with mild disease of the  remaining coronary arteries. 2.  Normal left ventricular function with ejection fraction of 55% with  high lateral wall hypokinesis. 3.  Normal hemodynamics. Assessment:  48 y.o. patient with:   Diagnosis Orders   1. Coronary artery disease involving native coronary artery of native heart without angina pectoris     2. Essential hypertension         Plan:  1. Work on stress management  2. Blood pressure well controlled. Continue cardiac medications as prescribed  3.  Recommend a cardiac healthy diet (low salt, avoid red meat, avoid fatty or fried foods, lots of fruits and vegetables) as well as regular moderate intensity activity for 30 minutes per day 3-5 times per week. (17 minutes per mile)  4. Follow up with PCP yearly  5. Follow up with me in 1 year    This note has been scribed in the presence of Ariela Ferrell MD by Maddie Uribe RN.     I, Dr. Ariela Ferrell, personally performed the services described in this documentation, as scribed by the above signed scribe in my presence. It is both accurate and complete to my knowledge. I agree with the details independently gathered by the clinical support staff, while the remaining scribed note accurately describes my personal service to the patient. Medical Decision Making: The following items were considered in medical decision making:  Independent review of images  Review / order clinical lab tests  Review / order radiology tests  Decision to obtain old records  Review and summation of old records as accessed through [employer] (a summary of my findings in these old records)      Time Based Itemization  A total of 30 minutes was spent on today's patient encounter. If applicable, non-patient-facing activities:  (X)Preparing to see the patient and reviewing records  (X) Individual interpretation of results  ( ) Discussion or coordination of care with other health care professionals  (X) Ordering of unique tests, medications, or procedures  (X) Documentation within the EHR             All questions and concerns were addressed to the patient/family. Alternatives to my treatment were discussed. The note was completed using EMR. Every effort was made to ensure accuracy; however, inadvertent computerized transcription errors may be present.     Ariela Ferrell MD, [address], [facility] [place]  [phone] [facility] office  [phone] [facility]  12/3/2021  1:38 PM

## 2021-12-03 NOTE — PATIENT INSTRUCTIONS
Plan:  1. Work on stress management  2. Blood pressure well controlled. Continue cardiac medications as prescribed  3. Recommend a cardiac healthy diet (low salt, avoid red meat, avoid fatty or fried foods, lots of fruits and vegetables) as well as regular moderate intensity activity for 30 minutes per day 3-5 times per week. (17 minutes per mile)  4. Follow up with PCP yearly  5.  Follow up with me in 1 year

## 2021-12-06 ENCOUNTER — TELEPHONE (OUTPATIENT)
Dept: MAMMOGRAPHY | Age: 46
End: 2021-12-06

## 2022-03-23 ENCOUNTER — TELEPHONE (OUTPATIENT)
Dept: PULMONOLOGY | Age: 47
End: 2022-03-23

## 2022-03-23 NOTE — TELEPHONE ENCOUNTER
NPT sleeping difficulty referred by Nicanor Pena CNP. Patient called with message left for patient to call back to office.

## 2022-03-29 ENCOUNTER — TELEPHONE (OUTPATIENT)
Dept: PULMONOLOGY | Age: 47
End: 2022-03-29

## 2022-03-29 NOTE — TELEPHONE ENCOUNTER
Npt ref sent by Barbara Valencia, sleeping difficulty. 3/29/22 [facility] to schedule with CMT.     4/4/22 [facility] to schedule.

## 2022-06-10 ENCOUNTER — TELEPHONE (OUTPATIENT)
Dept: PULMONOLOGY | Age: 47
End: 2022-06-10

## 2022-06-10 ENCOUNTER — TELEMEDICINE (OUTPATIENT)
Dept: PULMONOLOGY | Age: 47
End: 2022-06-10
Payer: COMMERCIAL

## 2022-06-10 DIAGNOSIS — R53.83 OTHER FATIGUE: ICD-10-CM

## 2022-06-10 DIAGNOSIS — G47.30 OBSERVED SLEEP APNEA: ICD-10-CM

## 2022-06-10 DIAGNOSIS — R06.83 SNORING: Primary | ICD-10-CM

## 2022-06-10 DIAGNOSIS — G47.10 HYPERSOMNIA: ICD-10-CM

## 2022-06-10 DIAGNOSIS — I10 ESSENTIAL HYPERTENSION: ICD-10-CM

## 2022-06-10 PROCEDURE — 99204 OFFICE O/P NEW MOD 45 MIN: CPT | Performed by: NURSE PRACTITIONER

## 2022-06-10 RX ORDER — OMEPRAZOLE 20 MG/1
20 CAPSULE, DELAYED RELEASE ORAL DAILY
COMMUNITY
Start: 2015-06-22

## 2022-06-10 ASSESSMENT — SLEEP AND FATIGUE QUESTIONNAIRES
ESS TOTAL SCORE: 15
HOW LIKELY ARE YOU TO NOD OFF OR FALL ASLEEP WHILE WATCHING TV: 2
HOW LIKELY ARE YOU TO NOD OFF OR FALL ASLEEP WHEN YOU ARE A PASSENGER IN A CAR FOR AN HOUR WITHOUT A BREAK: 2
HOW LIKELY ARE YOU TO NOD OFF OR FALL ASLEEP WHILE LYING DOWN TO REST IN THE AFTERNOON WHEN CIRCUMSTANCES PERMIT: 3
HOW LIKELY ARE YOU TO NOD OFF OR FALL ASLEEP WHILE SITTING INACTIVE IN A PUBLIC PLACE: 2
HOW LIKELY ARE YOU TO NOD OFF OR FALL ASLEEP WHILE SITTING AND TALKING TO SOMEONE: 0
HOW LIKELY ARE YOU TO NOD OFF OR FALL ASLEEP WHILE SITTING AND READING: 2
HOW LIKELY ARE YOU TO NOD OFF OR FALL ASLEEP WHILE SITTING QUIETLY AFTER LUNCH WITHOUT ALCOHOL: 3
HOW LIKELY ARE YOU TO NOD OFF OR FALL ASLEEP IN A CAR, WHILE STOPPED FOR A FEW MINUTES IN TRAFFIC: 1

## 2022-06-10 NOTE — LETTER
[facility] Pulmonary, Critical Care, and Sleep  [address] [address]  Phone: [phone]  Fax: [phone]           DAVID Townsend CNP      Rita 10, 2022     Patient: Gurinder Pike   MR Number: [id number]   YOB: 1975   Date of Visit: 6/10/2022       Dear Dr. Natalee Teixeira: Thank you for referring Salvador Kang to me for evaluation/treatment. Below are the relevant portions of my assessment and plan of care. Assessment:       · Snoring  · Observed sleep apnea   · Hypersomnia   · Fatigue  · CAD, HTNfollowed by cardiology        Plan:      · HST to evaluate forsleep related breathing disorder. · Treatment options were discussed with patient if HST reveals LINDSAY, including CPAP/APAPtherapy, oral appliances and upper airway surgery. · Sleep hygiene  · Avoidsedatives, alcohol and caffeinated drinks at bed time. · No driving motorized vehicles or operating heavy machinery while fatigue, drowsy or sleepy-patient verbalized understanding and agrees. · Weight loss is also recommended as a long-term intervention. · Complications of LINDSAY if not treated were discussed with patient patient to include systemic hypertension, pulmonary hypertension, cardiovascular morbidities, car accidents and all cause mortality. Discussed pathophysiology of LINDSAY with patient today  Patient education handout provided regarding sleep tips   Follow-up with cardiology for elevated blood pressure     If you have questions, please do not hesitate to call me. I [facility] forward [facility] with you.     Sincerely,        DAVID Townsend CNP    CC providers:    No Recipients

## 2022-06-10 NOTE — PROGRESS NOTES
Patient ID: Sandee Morris is a 50 y.o. female who is being seen today for   Chief Complaint   Patient presents with    New Patient     r/b jackie Ley     Referring: DAVID Ley-STEVE    HPI:   Sandee Morris is a 50 y.o. female for televideo appointment via video and audio doxy. me virtual visit for sleeping difficulties evaluation. Patient reports snoring at night for the past 5 years. Unknown if worse in supine position- states usually side sleeper. Wakes self snoring. Has witnessed apnea. No restorative sleep. +dry mouth upon awakening. Fatigue and tiredness during the day. No history of sleep apnea. Bedtime 1030 pm and rise time is 7-710 am. It takes 45 minutes to fall asleep- lays there. 1-2 nocturia. Wakes up 3-4 times at night. It takes usually few minutes to fall back a sleep. Takes occasional nap during the day- naps when she is able to ( 90 minutes). No headache in am. No car wrecks or near wrecks because of the sleepiness. No nodding off while driving, can get sleepy at times. No weight gain. +forgetfulness and  decreased concentration. Drinks 1 caffinated beverages per day. No significant alcohol. No restless feelings in legs at night. No loss of muscle strength when angry or laugh. No hallucination when dozing off or waking up from sleep. No paralysis upon awakening from sleep or going to sleep. No teeth grinding. No nightmares. No sleep walking. No night time panic attacks. No narcotics. No drug abuse. +history of depression feels pretty well controlled. No history of anxiety. No history of atrial fibrillation. No history of DM. +history of HTN. +history of ischemic heart disease. No history of stroke. ESS is 15. No smoking. No known FH for LINDSAY, RLS or narcolepsy.      Sleep Medicine 6/10/2022   Sitting and reading 2   Watching TV 2   Sitting, inactive in a public place (e.g. a theatre or a meeting) 2   As a passenger in a car for an hour without a break 2   Lying down to rest in the afternoon when circumstances permit 3   Sitting and talking to someone 0   Sitting quietly after a lunch without alcohol 3   In a car, while stopped for a few minutes in traffic 1   Total score 15       Past Medical History:  Past Medical History:   Diagnosis Date    CAD (coronary artery disease)     Depression     Hyperlipidemia     Hypertension        Past Surgical History:        Procedure Laterality Date     SECTION      CORONARY ANGIOPLASTY WITH STENT PLACEMENT  2018    CYSTOSCOPY N/A 2020    URETHRAL DILATATION    CYSTOSCOPY N/A 2020    CYSTOSCOPY, URETHRAL DILATATION performed by Robert Santiago MD at [address] CATH LAB PROCEDURE      DILATION AND CURETTAGE OF UTERUS      HYSTERECTOMY (CERVIX STATUS UNKNOWN)      PTCA         Allergies:  is allergic to lisinopril and sulfa antibiotics. Social History:    TOBACCO:   reports that she quit smoking about 3 years ago. Her smoking use included cigarettes. She smoked 0.25 packs per day. She has never used smokeless tobacco.  ETOH:   reports current alcohol use of about 1.0 standard drink of alcohol per week.     Family History:       Problem Relation Age of Onset    Heart Surgery Father        Current Medications:    Current Outpatient Medications:     omeprazole (PRILOSEC) 20 MG delayed release capsule, Take 20 mg by mouth daily, Disp: , Rfl:     FLUoxetine (PROZAC) 20 MG capsule, Take 40 mg by mouth daily, Disp: , Rfl:     carvedilol (COREG) 3.125 MG tablet, TAKE 1 TABLET TWICE A DAY, Disp: 180 tablet, Rfl: 3    rosuvastatin (CRESTOR) 40 MG tablet, TAKE 1 TABLET DAILY, Disp: 90 tablet, Rfl: 3    Levocetirizine Dihydrochloride (XYZAL PO), Take by mouth, Disp: , Rfl:     Fluticasone Propionate (FLONASE NA), by Nasal route, Disp: , Rfl:     [employer] ASPIRIN ADULT LOW DOSE 81 MG chewable tablet, TAKE 1 TABLET BY MOUTH EVERY DAY, Disp: 90 tablet, Rfl: 3        Review of Systems  Constitutional: Negative for fever  HENT: Negative for sore throat  Eyes: Negative for redness   Respiratory: Negative for dyspnea, cough  Cardiovascular: Negative for chest pain  Gastrointestinal: Negative for vomiting, diarrhea   Genitourinary: Negative for hematuria   Musculoskeletal: Negative for arthralgias   Skin: Negative for rash  Neurological: Negative for syncope  Hematological: Negative for adenopathy  Psychiatric/Behavorial: Negative for anxiety      Objective:   PHYSICAL EXAM:  There were no vitals taken for this visit. Physical Exam  Exam:  Gen: No acute distress, does not appear to be in pain. Appears well developed and nourished. HENT: Head is normocephalic and atraumatic. Normal appearing nose. External Ears normal.   Neck: No visualized mass. Trachea is midline   Eyes: EOM intact. No visible discharge. Resp:No visualized signs of difficulty breathing or respiratory distress, speaking in full sentences. Respiratory effort normal.  Neuro: Awake. Alert. Able to follow commands. No facial asymmetry. Skin: No significant lesions or discoloration noted on facial skin    Musculoskeletal: Normal range of motion of the neck. Psych: Oriented x 3. No anxiety. Normal affect. DATA:   7/14/2018 echo EF 65%  Cardiology notes reviewed    Assessment:       · Snoring  · Observed sleep apnea   · Hypersomnia   · Fatigue  · CAD, HTN-followed by cardiology        Plan:      · HST to evaluate forsleep related breathing disorder. · Treatment options were discussed with patient if HST reveals LINDSAY, including CPAP/APAPtherapy, oral appliances and upper airway surgery. · Sleep hygiene  · Avoidsedatives, alcohol and caffeinated drinks at bed time. · No driving motorized vehicles or operating heavy machinery while fatigue, drowsy or sleepy-patient verbalized understanding and agrees. · Weight loss is also recommended as a long-term intervention.     · Complications of LINDSAY if not treated were discussed with patient patient to include systemic hypertension, pulmonary hypertension, cardiovascular morbidities, car accidents and all cause mortality. Discussed pathophysiology of LINDSAY with patient today  Patient education handout provided regarding sleep tips   Follow-up with cardiology for elevated blood pressure    Nickolas Galarza, was evaluated through a synchronous (real-time) audio-video encounter. The patient (or guardian if applicable) is aware that this is a billable service, which includes applicable co-pays. This Virtual Visit was conducted with patient's (and/or legal guardian's) consent. The visit was conducted pursuant to the emergency declaration under the [address] [place] [address], [address] [place] [address] waiver authority and the [employer] Act. Patient identification was verified, and a caregiver was present when appropriate. The patient was located at Home: [address] [place] [address]. Provider was located at Home ([place] [address]): [place]. Total time spent for this encounter: Not billed by time    --DAVID Black CNP on 6/10/2022 at 10:28 AM    An electronic signature was used to authenticate this note.

## 2022-06-10 NOTE — PATIENT INSTRUCTIONS

## 2022-06-10 NOTE — TELEPHONE ENCOUNTER
Within this Telehealth Consent, the terms you and yours refer to the person using the Telehealth Service (Service), or in the case of a use of the Service by or on behalf of a minor, you and yours refer to and include (i) the parent or legal guardian who provides consent to the use of the Service by such minor or uses the Service on behalf of such minor, and (ii) the minor for whom consent is being provided or on whose behalf the Service is being utilized. When using Service, you will be consulting with your health care providers via the use of Telehealth.   Telehealth involves the delivery of healthcare services using electronic communications, information technology or other means between a healthcare provider and a patient who are not in the same physical location. Telehealth may be used for diagnosis, treatment, follow-up and/or patient education, and may include, but is not limited to, one or more of the following:    Electronic transmission of medical records, photo images, personal health information or other data between a patient and a healthcare provider    Interactions between a patient and healthcare provider via audio, video and/or data communications    Use of output data from medical devices, sound and video files    Anticipated Benefits   The use of Telehealth by your Provider(s) through the Service may have the following possible benefits:    Making it easier and more efficient for you to access medical care and treatment for the conditions treated by such Provider(s) utilizing the Service    Allowing you to obtain medical care and treatment by Provider(s) at times that are convenient for you    Enabling you to interact with Provider(s) without the necessity of an in-office appointment     Possible Risks   While the use of Telehealth can provide potential benefits for you, there are also potential risks associated with the use of Telehealth.  These risks include, but may not be limited to the following:    Your Provider(s) may not able to provide medical treatment for your particular condition and you may be required to seek alternative healthcare or emergency care services.  The electronic systems or other security protocols or safeguards used in the Service could fail, causing a breach of privacy of your medical or other information.  Given regulatory requirements in certain jurisdictions, your Provider(s) diagnosis and/or treatment options, especially pertaining to certain prescriptions, may be limited. Acceptance   1. You understand that Services will be provided via Telehealth. This process involves the use of HIPAA compliant and secure, real-time audio-visual interfacing with a qualified and appropriately trained provider located at [facility]. 2. You understand that, under no circumstances, will this session be recorded. 3. You understand that the Provider(s) at [facility] and other clinical participants will be party to the information obtained during the Telehealth session in accordance with best medical practices. 4. You understand that the information obtained during the Telehealth session will be used to help determine the most appropriate treatment options. 5. You understand that You have the right to revoke this consent at any point in time. 6. You understand that Telehealth is voluntary, and that continued treatment is not dependent upon consent. 7. You understand that, in the event of non-consent to Telehealth services and/or technical difficulties, you will obtain services as typically provided in the absence of Telehealth technology. 8. You understand that this consent will be kept in Your medical record. 9. No potential benefits from the use of Telehealth or specific results can be guaranteed. Your condition may not be cured or improved and, in some cases, may get worse.    10. There are limitations in the provision of medical care and treatment via Telehealth and the Service and you may not be able to receive diagnosis and/or treatment through the Service for every condition for which you seek diagnosis and/or treatment. 11. There are potential risks to the use of Telehealth, including but not limited to the risks described in this Telehealth Consent. 12. Your Provider(s) have discussed the use of Telehealth and the Service with you, including the benefits and risks of such and you have provided oral consent to your Provider(s) for the use of Telehealth and the Service. 15. You understand that it is your duty to provide your Provider(s) truthful, accurate and complete information, including all relevant information regarding care that you may have received or may be receiving from other healthcare providers outside of the Service. 14. You understand that each of your Provider(s) may determine in his or sole discretion that your condition is not suitable for diagnosis and/or treatment using the Service, and that you may need to seek medical care and treatment a specialist or other healthcare provider, outside of the Service. 15. You understand that you are fully responsible for payment for all services provided by Provider(s) or through use of the Service and that you may not be able to use third-party insurance. 16. You represent that (a) you have read this Telehealth Consent carefully, (b) you understand the risks and benefits of the Service and the use of Telehealth in the medical care and treatment provided to you by Provider(s) using the Service, and (c) you have the legal capacity and authority to provide this consent for yourself and/or the minor for which you are consenting under applicable federal and state laws, including laws relating to the age of [de-identified] and/or parental/guardian consent.    17. You give your informed consent to the use of Telehealth by Provider(s) using the Service under

## 2022-06-24 ENCOUNTER — APPOINTMENT (OUTPATIENT)
Dept: GENERAL RADIOLOGY | Age: 47
End: 2022-06-24
Payer: COMMERCIAL

## 2022-06-24 ENCOUNTER — HOSPITAL ENCOUNTER (OUTPATIENT)
Age: 47
Setting detail: OBSERVATION
Discharge: HOME | End: 2022-06-25
Attending: EMERGENCY MEDICINE | Admitting: INTERNAL MEDICINE
Payer: COMMERCIAL

## 2022-06-24 DIAGNOSIS — R07.9 CHEST PAIN, UNSPECIFIED TYPE: Primary | ICD-10-CM

## 2022-06-24 LAB
A/G RATIO: 1.5 (ref 1.1–2.2)
ALBUMIN SERPL-MCNC: 4.1 G/DL (ref 3.4–5)
ALP BLD-CCNC: 80 U/L (ref 40–129)
ALT SERPL-CCNC: 20 U/L (ref 10–40)
ANION GAP SERPL CALCULATED.3IONS-SCNC: 9 MMOL/L (ref 3–16)
AST SERPL-CCNC: 19 U/L (ref 15–37)
BASOPHILS ABSOLUTE: 0 K/UL (ref 0–0.2)
BASOPHILS RELATIVE PERCENT: 0.7 %
BILIRUB SERPL-MCNC: 0.4 MG/DL (ref 0–1)
BUN BLDV-MCNC: 15 MG/DL (ref 7–20)
CALCIUM SERPL-MCNC: 9.7 MG/DL (ref 8.3–10.6)
CHLORIDE BLD-SCNC: 104 MMOL/L (ref 99–110)
CO2: 28 MMOL/L (ref 21–32)
CREAT SERPL-MCNC: 0.7 MG/DL (ref 0.6–1.1)
EKG ATRIAL RATE: 68 BPM
EKG DIAGNOSIS: NORMAL
EKG P AXIS: 51 DEGREES
EKG P-R INTERVAL: 148 MS
EKG Q-T INTERVAL: 410 MS
EKG QRS DURATION: 78 MS
EKG QTC CALCULATION (BAZETT): 435 MS
EKG R AXIS: 21 DEGREES
EKG T AXIS: 61 DEGREES
EKG VENTRICULAR RATE: 68 BPM
EOSINOPHILS ABSOLUTE: 0.1 K/UL (ref 0–0.6)
EOSINOPHILS RELATIVE PERCENT: 3 %
GFR AFRICAN AMERICAN: >60
GFR NON-AFRICAN AMERICAN: >60
GLUCOSE BLD-MCNC: 84 MG/DL (ref 70–99)
HCT VFR BLD CALC: 41.1 % (ref 36–48)
HEMOGLOBIN: 13.9 G/DL (ref 12–16)
LYMPHOCYTES ABSOLUTE: 1.6 K/UL (ref 1–5.1)
LYMPHOCYTES RELATIVE PERCENT: 32.8 %
MCH RBC QN AUTO: 31.2 PG (ref 26–34)
MCHC RBC AUTO-ENTMCNC: 33.9 G/DL (ref 31–36)
MCV RBC AUTO: 92.1 FL (ref 80–100)
MONOCYTES ABSOLUTE: 0.6 K/UL (ref 0–1.3)
MONOCYTES RELATIVE PERCENT: 12.1 %
NEUTROPHILS ABSOLUTE: 2.5 K/UL (ref 1.7–7.7)
NEUTROPHILS RELATIVE PERCENT: 51.4 %
PDW BLD-RTO: 13 % (ref 12.4–15.4)
PLATELET # BLD: 199 K/UL (ref 135–450)
PMV BLD AUTO: 8.8 FL (ref 5–10.5)
POTASSIUM SERPL-SCNC: 3.6 MMOL/L (ref 3.5–5.1)
PRO-BNP: 35 PG/ML (ref 0–124)
RBC # BLD: 4.46 M/UL (ref 4–5.2)
SODIUM BLD-SCNC: 141 MMOL/L (ref 136–145)
TOTAL PROTEIN: 6.9 G/DL (ref 6.4–8.2)
TROPONIN: <0.01 NG/ML
TROPONIN: <0.01 NG/ML
WBC # BLD: 5 K/UL (ref 4–11)

## 2022-06-24 PROCEDURE — 71046 X-RAY EXAM CHEST 2 VIEWS: CPT

## 2022-06-24 PROCEDURE — 36415 COLL VENOUS BLD VENIPUNCTURE: CPT

## 2022-06-24 PROCEDURE — 99284 EMERGENCY DEPT VISIT MOD MDM: CPT | Performed by: INTERNAL MEDICINE

## 2022-06-24 PROCEDURE — 6360000002 HC RX W HCPCS: Performed by: INTERNAL MEDICINE

## 2022-06-24 PROCEDURE — 96360 HYDRATION IV INFUSION INIT: CPT

## 2022-06-24 PROCEDURE — 80053 COMPREHEN METABOLIC PANEL: CPT

## 2022-06-24 PROCEDURE — 99285 EMERGENCY DEPT VISIT HI MDM: CPT

## 2022-06-24 PROCEDURE — 83880 ASSAY OF NATRIURETIC PEPTIDE: CPT

## 2022-06-24 PROCEDURE — 2580000003 HC RX 258: Performed by: NURSE PRACTITIONER

## 2022-06-24 PROCEDURE — 6370000000 HC RX 637 (ALT 250 FOR IP): Performed by: NURSE PRACTITIONER

## 2022-06-24 PROCEDURE — 85025 COMPLETE CBC W/AUTO DIFF WBC: CPT

## 2022-06-24 PROCEDURE — 6370000000 HC RX 637 (ALT 250 FOR IP): Performed by: INTERNAL MEDICINE

## 2022-06-24 PROCEDURE — 84484 ASSAY OF TROPONIN QUANT: CPT

## 2022-06-24 PROCEDURE — 96361 HYDRATE IV INFUSION ADD-ON: CPT

## 2022-06-24 PROCEDURE — G0378 HOSPITAL OBSERVATION PER HR: HCPCS

## 2022-06-24 PROCEDURE — 93010 ELECTROCARDIOGRAM REPORT: CPT | Performed by: INTERNAL MEDICINE

## 2022-06-24 PROCEDURE — 96372 THER/PROPH/DIAG INJ SC/IM: CPT

## 2022-06-24 PROCEDURE — 93005 ELECTROCARDIOGRAM TRACING: CPT | Performed by: EMERGENCY MEDICINE

## 2022-06-24 RX ORDER — SODIUM CHLORIDE 9 MG/ML
INJECTION, SOLUTION INTRAVENOUS PRN
Status: DISCONTINUED | OUTPATIENT
Start: 2022-06-24 | End: 2022-06-25 | Stop reason: HOSPADM

## 2022-06-24 RX ORDER — ROSUVASTATIN CALCIUM 10 MG/1
40 TABLET, COATED ORAL EVERY MORNING
Status: DISCONTINUED | OUTPATIENT
Start: 2022-06-25 | End: 2022-06-25 | Stop reason: HOSPADM

## 2022-06-24 RX ORDER — ASPIRIN 81 MG/1
243 TABLET, CHEWABLE ORAL ONCE
Status: COMPLETED | OUTPATIENT
Start: 2022-06-24 | End: 2022-06-24

## 2022-06-24 RX ORDER — 0.9 % SODIUM CHLORIDE 0.9 %
1000 INTRAVENOUS SOLUTION INTRAVENOUS ONCE
Status: COMPLETED | OUTPATIENT
Start: 2022-06-24 | End: 2022-06-24

## 2022-06-24 RX ORDER — ACETAMINOPHEN 650 MG/1
650 SUPPOSITORY RECTAL EVERY 6 HOURS PRN
Status: DISCONTINUED | OUTPATIENT
Start: 2022-06-24 | End: 2022-06-25 | Stop reason: HOSPADM

## 2022-06-24 RX ORDER — FLUOXETINE HYDROCHLORIDE 20 MG/1
40 CAPSULE ORAL DAILY
Status: DISCONTINUED | OUTPATIENT
Start: 2022-06-25 | End: 2022-06-25 | Stop reason: HOSPADM

## 2022-06-24 RX ORDER — ENOXAPARIN SODIUM 100 MG/ML
40 INJECTION SUBCUTANEOUS DAILY
Status: DISCONTINUED | OUTPATIENT
Start: 2022-06-24 | End: 2022-06-25 | Stop reason: HOSPADM

## 2022-06-24 RX ORDER — SODIUM CHLORIDE 0.9 % (FLUSH) 0.9 %
5-40 SYRINGE (ML) INJECTION EVERY 12 HOURS SCHEDULED
Status: DISCONTINUED | OUTPATIENT
Start: 2022-06-24 | End: 2022-06-25 | Stop reason: HOSPADM

## 2022-06-24 RX ORDER — ASPIRIN 81 MG/1
81 TABLET, CHEWABLE ORAL DAILY
Status: DISCONTINUED | OUTPATIENT
Start: 2022-06-25 | End: 2022-06-25 | Stop reason: HOSPADM

## 2022-06-24 RX ORDER — NITROGLYCERIN 0.4 MG/1
0.4 TABLET SUBLINGUAL EVERY 5 MIN PRN
Status: DISCONTINUED | OUTPATIENT
Start: 2022-06-24 | End: 2022-06-25 | Stop reason: HOSPADM

## 2022-06-24 RX ORDER — POLYETHYLENE GLYCOL 3350 17 G/17G
17 POWDER, FOR SOLUTION ORAL DAILY PRN
Status: DISCONTINUED | OUTPATIENT
Start: 2022-06-24 | End: 2022-06-25 | Stop reason: HOSPADM

## 2022-06-24 RX ORDER — NITROGLYCERIN 0.4 MG/1
0.4 TABLET SUBLINGUAL EVERY 5 MIN PRN
Status: DISCONTINUED | OUTPATIENT
Start: 2022-06-24 | End: 2022-06-24

## 2022-06-24 RX ORDER — SODIUM CHLORIDE 0.9 % (FLUSH) 0.9 %
5-40 SYRINGE (ML) INJECTION PRN
Status: DISCONTINUED | OUTPATIENT
Start: 2022-06-24 | End: 2022-06-25 | Stop reason: HOSPADM

## 2022-06-24 RX ORDER — MORPHINE SULFATE 2 MG/ML
1 INJECTION, SOLUTION INTRAMUSCULAR; INTRAVENOUS EVERY 4 HOURS PRN
Status: DISCONTINUED | OUTPATIENT
Start: 2022-06-24 | End: 2022-06-25 | Stop reason: HOSPADM

## 2022-06-24 RX ORDER — ONDANSETRON 4 MG/1
4 TABLET, ORALLY DISINTEGRATING ORAL EVERY 8 HOURS PRN
Status: DISCONTINUED | OUTPATIENT
Start: 2022-06-24 | End: 2022-06-25 | Stop reason: HOSPADM

## 2022-06-24 RX ORDER — ONDANSETRON 2 MG/ML
4 INJECTION INTRAMUSCULAR; INTRAVENOUS EVERY 6 HOURS PRN
Status: DISCONTINUED | OUTPATIENT
Start: 2022-06-24 | End: 2022-06-25 | Stop reason: HOSPADM

## 2022-06-24 RX ORDER — ACETAMINOPHEN 325 MG/1
650 TABLET ORAL EVERY 6 HOURS PRN
Status: DISCONTINUED | OUTPATIENT
Start: 2022-06-24 | End: 2022-06-25 | Stop reason: HOSPADM

## 2022-06-24 RX ORDER — CARVEDILOL 3.12 MG/1
3.12 TABLET ORAL 2 TIMES DAILY
Status: DISCONTINUED | OUTPATIENT
Start: 2022-06-24 | End: 2022-06-25 | Stop reason: HOSPADM

## 2022-06-24 RX ADMIN — CARVEDILOL 3.12 MG: 3.12 TABLET, FILM COATED ORAL at 20:27

## 2022-06-24 RX ADMIN — SODIUM CHLORIDE 1000 ML: 9 INJECTION, SOLUTION INTRAVENOUS at 11:47

## 2022-06-24 RX ADMIN — ENOXAPARIN SODIUM 40 MG: 100 INJECTION SUBCUTANEOUS at 17:36

## 2022-06-24 RX ADMIN — ASPIRIN 243 MG: 81 TABLET, CHEWABLE ORAL at 11:44

## 2022-06-24 ASSESSMENT — PAIN SCALES - GENERAL: PAINLEVEL_OUTOF10: 5

## 2022-06-24 ASSESSMENT — PAIN DESCRIPTION - LOCATION: LOCATION: CHEST

## 2022-06-24 ASSESSMENT — PAIN DESCRIPTION - DESCRIPTORS: DESCRIPTORS: PRESSURE

## 2022-06-24 ASSESSMENT — PAIN - FUNCTIONAL ASSESSMENT: PAIN_FUNCTIONAL_ASSESSMENT: 0-10

## 2022-06-24 NOTE — H&P
Hospital Medicine History & Physical      PCP: DAVID Brooks CNP    Date of Admission: 2022    Date of Service: Pt seen/examined on 22 and  Placed in Observation. Chief Complaint:  Chest pain      History Of Present Illness:      50 y.o. female with hx of cad,htn, hld, depression who presented to [facility] with chest pain. Pt woke up to go to work around 630am and drove in w/o issues. She was sitting at her desk when she became lightheaded/dizzy and developed chest heaviness/pressure (as if someone sitting on it), at 5/10 severity. She couldn't catch her breath. She also thought she noted heart beat pauses. Her discomfort was radiating to her jaw and also noted weakness in both arms. No n/v/sweating. She felt this was similar presentation to 2018 when she needed cardiac stent.  -she drove herself in. Asa given in ER. Past Medical History:          Diagnosis Date    CAD (coronary artery disease)     Depression     Hyperlipidemia     Hypertension        Past Surgical History:          Procedure Laterality Date     SECTION      CORONARY ANGIOPLASTY WITH STENT PLACEMENT  2018    CYSTOSCOPY N/A 2020    URETHRAL DILATATION    CYSTOSCOPY N/A 2020    CYSTOSCOPY, URETHRAL DILATATION performed by Andria Lambert MD at [address] CATH LAB PROCEDURE      DILATION AND CURETTAGE OF UTERUS      HYSTERECTOMY (CERVIX STATUS UNKNOWN)      PTCA         Medications Prior to Admission:      Prior to Admission medications    Medication Sig Start Date End Date Taking?  Authorizing Provider   omeprazole (PRILOSEC) 20 MG delayed release capsule Take 20 mg by mouth daily 6/22/15   Historical Provider, MD   FLUoxetine (PROZAC) 20 MG capsule Take 40 mg by mouth daily    Historical Provider, MD   carvedilol (COREG) 3.125 MG tablet TAKE 1 TABLET TWICE A DAY 21   Fede Heath MD   rosuvastatin (CRESTOR) 40 MG tablet TAKE 1 TABLET DAILY 7/2/21   Curt Tejeda MD   Levocetirizine Dihydrochloride (XYZAL PO) Take by mouth    Historical Provider, MD   Fluticasone Propionate (FLONASE NA) by Nasal route    Historical Provider, MD   [employer]S ASPIRIN ADULT LOW DOSE 81 MG chewable tablet TAKE 1 TABLET BY MOUTH EVERY DAY 11/12/18   Curt Tejeda MD       Allergies:  Lisinopril and Sulfa antibiotics    Social History:      The patient currently lives at home    TOBACCO:   reports that she quit smoking about 3 years ago. Her smoking use included cigarettes. She smoked 0.25 packs per day. She has never used smokeless tobacco.  ETOH:   reports current alcohol use of about 1.0 standard drink of alcohol per week. E-cigarette/Vaping     Questions Responses    E-cigarette/Vaping Use Never User    Start Date     Passive Exposure     Quit Date     Counseling Given     Comments             Family History:       Reviewed in detail and negative for DM, CAD, Cancer, CVA. Positive as follows:        Problem Relation Age of Onset    Heart Surgery Father        REVIEW OF SYSTEMS COMPLETED:   Pertinent positives as noted in the HPI. All other systems reviewed and negative. PHYSICAL EXAM PERFORMED:    /84   Pulse 92   Temp 97.7 °F (36.5 °C) (Oral)   Resp 21   Ht 5' 4\" (1.626 m)   Wt 165 lb (74.8 kg)   SpO2 96%   BMI 28.32 kg/m²     General appearance:  No apparent distress, appears stated age and cooperative. HEENT:  Normal cephalic, atraumatic without obvious deformity. Pupils equal, round, and reactive to light. Extra ocular muscles intact. Conjunctivae/corneas clear. Neck: Supple, with full range of motion. No jugular venous distention. Trachea midline. Respiratory:  Normal respiratory effort. Clear to auscultation, bilaterally without Rales/Wheezes/Rhonchi. Cardiovascular:  Regular rate and rhythm with normal S1/S2 without murmurs, rubs or gallops. Abdomen: Soft, non-tender, non-distended with normal bowel sounds.   Musculoskeletal:  No clubbing, cyanosis or edema bilaterally. Full range of motion without deformity. Skin: Skin color, texture, turgor normal.  No rashes or lesions. Neurologic:  Neurovascularly intact without any focal sensory/motor deficits. Cranial nerves: II-XII intact, grossly non-focal.  Psychiatric:  Alert and oriented, thought content appropriate, normal insight  Capillary Refill: Brisk,3 seconds, normal  Peripheral Pulses: +2 palpable, equal bilaterally       Labs:     Recent Labs     06/24/22  1108   WBC 5.0   HGB 13.9   HCT 41.1        Recent Labs     06/24/22  1108      K 3.6      CO2 28   BUN 15   CREATININE 0.7   CALCIUM 9.7     Recent Labs     06/24/22  1108   AST 19   ALT 20   BILITOT 0.4   ALKPHOS 80     No results for input(s): INR in the last 72 hours. Recent Labs     06/24/22  1108   TROPONINI <0.01       Urinalysis:      Lab Results   Component Value Date    NITRU Negative 09/06/2020    WBCUA 0-2 09/06/2020    BACTERIA 2+ 12/21/2019    RBCUA 0-2 09/06/2020    BLOODU SMALL 09/06/2020    SPECGRAV 1.010 09/06/2020    GLUCOSEU Negative 09/06/2020       Radiology:     EKG:  I have reviewed the EKG with the following interpretation: nsr, rate of 68, nl axis, no gross ischemic changes noted    XR CHEST (2 VW)   Final Result   No radiographic evidence of acute pulmonary disease.              Consults:    IP CONSULT TO CARDIOLOGY    ASSESSMENT:    Active Hospital Problems    Diagnosis Date Noted    Chest pain [R07.9]          PLAN:    Chest pain- concern for cardiac etiology  -tele  -Trend elías  -Cards consulted given prior hx  -gxt myoview ordered  -prn morphine/nitro ordered  -on home asa/statin    CAD- with hx of stent of first diagonal in 2018  -continued home meds    HTN-stable  On coreg    Depression- on prozac    HLD- on statin    DVT Prophylaxis: lovenox  Diet: general, NPO mn  Code Status:FULL    PT/OT Eval Status: not ordered    Dispo - pending workup       Sandra Gonsales MD    Thank you Ajith Harrison DAVID Ramirez CNP for the opportunity to be involved in this patient's care. If you have any questions or concerns please feel free to contact me at [phone].

## 2022-06-24 NOTE — PROGRESS NOTES
Pt a/o. Oriented to room. Pt stated no chest pain, no lightheadedness, no dizziness, no nausea. No emesis. Pt educated on diet and knows NPO at midnight and what it means. Pt ambulates in room without any assistive devices with steady gait. Bed locked in lowest position; side rails 2/4; call light and bedside table within reach of pt.

## 2022-06-24 NOTE — CONSULTS
[address] [facility]   Cardiovascular Evaluation    PATIENT: Lori Schulte  DATE: 2022  MRN: [id number]  CSN: [id number]  : 1975    Primary Care Doctor/Referring provider: DAVID Piña CNP, Mayela Prince MD     Reason for evaluation/Chief complaint:   Chest Pain (started an hour ago at work, [employer] someone is sitting on me\". history of MI in 2018.)      Subjective:    History of present illness on initial date of evaluation:   Lori Schulte is a 50 y.o. patient who presents to the hospital for an evaluation of multiple complaints including chest pain, shortness of breath, palpitations, tiredness, and fatigue. Patient states that she was at work this morning and started to experience these symptoms. This prompted her to seek medical evaluation in the emergency room. While in the emergency room, there was concern for acute coronary syndrome. Cardiology was consulted urgently for further recommendations. Patient was seen evaluated in the emergency room. On my evaluation she denied any active chest pain. She states that her symptom complex was somewhat similar to previous episodes in the past during which time she had stenting done. Patient Active Problem List   Diagnosis    Acute ST elevation myocardial infarction (STEMI) involving left anterior descending (LAD) coronary artery without development of Q waves ([address]    STEMI (ST elevation myocardial infarction) [facility] [address] 18 PCI to DIAG     Chest pain    Coronary artery disease involving native coronary artery of native heart without angina pectoris    Shortness of breath    Essential hypertension    Tobacco abuse    Pure hypercholesterolemia         Cardiac Testing: I have reviewed the findings below.   EKG:  ECHO:   STRESS TEST:  CATH:  BYPASS:  VASCULAR:    Past Medical History:   has a past medical history of CAD (coronary artery disease), Depression, Hyperlipidemia, and Hypertension. Surgical History:   has a past surgical history that includes  section; Hysterectomy; Dilation and curettage of uterus; Diagnostic Cardiac Cath Lab Procedure; Percutaneous Transluminal Coronary Angio; Coronary angioplasty with stent (2018); Cystoscopy (N/A, 2020); and Cystoscopy (N/A, 2020). Social History:   reports that she quit smoking about 3 years ago. Her smoking use included cigarettes. She smoked 0.25 packs per day. She has never used smokeless tobacco. She reports current alcohol use of about 1.0 standard drink of alcohol per week. She reports that she does not use drugs. Family History:  No evidence for sudden cardiac death or premature CAD    Medications:  Reviewed and are listed in nursing record. and/or listed below  Outpatient Medications:  Prior to Admission medications    Medication Sig Start Date End Date Taking? Authorizing Provider   omeprazole (PRILOSEC) 20 MG delayed release capsule Take 20 mg by mouth daily 6/22/15   Historical Provider, MD   FLUoxetine (PROZAC) 20 MG capsule Take 40 mg by mouth daily    Historical Provider, MD   carvedilol (COREG) 3.125 MG tablet TAKE 1 TABLET TWICE A DAY 21   Nereyda Galarza MD   rosuvastatin (CRESTOR) 40 MG tablet TAKE 1 TABLET DAILY 21   Nereyda Galarza MD   Levocetirizine Dihydrochloride (XYZAL PO) Take by mouth    Historical Provider, MD   Fluticasone Propionate (FLONASE NA) by Nasal route    Historical Provider, MD   [employer] ASPIRIN ADULT LOW DOSE 81 MG chewable tablet TAKE 1 TABLET BY MOUTH EVERY DAY 18   Nereyda Galarza MD       In-patient schedule medications:        Infusion Medications: Allergies:  Lisinopril and Sulfa antibiotics     Review of Systems:   All 14 point review of symptoms completed. Pertinent positives identified in the HPI, all other review of symptoms findings as below.      Review of Systems - History obtained from the patient  General ROS: negative for - chills, fever or night sweats  Psychological ROS: negative for - disorientation or hallucinations  Ophthalmic ROS: negative for - dry eyes, eye pain or loss of vision  ENT ROS: negative for - nasal discharge or sore throat  Allergy and Immunology ROS: negative for - hives or itchy/watery eyes  Hematological and Lymphatic ROS: negative for - jaundice or night sweats  Endocrine ROS: negative for - mood swings or temperature intolerance  Breast ROS: deferred  Respiratory ROS: negative for - hemoptysis or stridor  Gastrointestinal ROS: no abdominal pain, change in bowel habits, or black or bloody stools  Genito-Urinary ROS: no dysuria, trouble voiding, or hematuria  Musculoskeletal ROS: negative for - gait disturbance, joint pain or joint stiffness  Neurological ROS: negative for - seizures or speech problems  Dermatological ROS: negative for - rash or skin lesion changes      Physical Examination:    [unfilled]  /78   Pulse 84   Temp 97.7 °F (36.5 °C) (Oral)   Resp 16   Ht 5' 4\" (1.626 m)   Wt 165 lb (74.8 kg)   SpO2 96%   BMI 28.32 kg/m²    Weight: 165 lb (74.8 kg)     Wt Readings from Last 3 Encounters:   06/24/22 165 lb (74.8 kg)   12/03/21 167 lb (75.8 kg)   12/02/20 170 lb (77.1 kg)     No intake or output data in the 24 hours ending 06/24/22 1446    General Appearance:  Alert, cooperative, no distress, appears stated age   Head:  Normocephalic, without obvious abnormality, atraumatic   Eyes:  PERRL, conjunctiva/corneas clear       Nose: Nares normal, no drainage or sinus tenderness   Throat: Lips, mucosa, and tongue normal   Neck: Supple, symmetrical, trachea midline, no adenopathy, thyroid: not enlarged, symmetric, no tenderness/mass/nodules, no carotid bruit or JVD       Lungs:   Clear to auscultation bilaterally, respirations unlabored   Chest Wall:  No tenderness or deformity   Heart:  Regular rhythm and normal rate; S1, S2 are normal; no murmur noted; no rub or gallop   Abdomen:   Soft, non-tender, bowel sounds active all four quadrants,  no masses, no organomegaly           Extremities: Extremities normal, atraumatic, no cyanosis or edema   Pulses: 2+ and symmetric   Skin: Skin color, texture, turgor normal, no rashes or lesions   Pysch: Normal mood and affect   Neurologic: Normal gross motor and sensory exam.         Labs  Recent Labs     06/24/22  1108   WBC 5.0   HGB 13.9   HCT 41.1   MCV 92.1        Recent Labs     06/24/22  1108   CREATININE 0.7   BUN 15      K 3.6      CO2 28     No results for input(s): INR, PROTIME in the last 72 hours. Recent Labs     06/24/22  1108   TROPONINI <0.01     Invalid input(s): PRO-BNP  No results for input(s): CHOL, LDL, HDL in the last 72 hours. Invalid input(s): TG      Imaging:  I have reviewed the below testing personally and my interpretation is below. EKG:  Normal sinus rhythmNormal ECGConfirmed by Renata Chaney MD, Jessica Crespo ([id number]) on 6/24/2022 1:24:07 PM    CXR:      Assessment:  50 y.o. patient with:  Active Problems:    Chest pain  Resolved Problems:    * No resolved hospital problems. *      Problem List Items Addressed This Visit     Chest pain - Primary          Plan:  1. The patient's symptoms and associated risk factors suggest an intermediate probability of ischemic heart disease as the cause for the symptoms. I recommend the patient undergo non-invasive evaluation with stress testing to further evaluate the symptoms. 2. Serial troponin levels will be ordered and reviewed  3. The patient has been given instructions on addressing diet, regular exercise, weight control, smoking abstention, medication compliance, and stress minimization. 4. The patient should be treated with these therapies unless contraindicated:   ~asa daily   ~beta-blockers   ~statin therapy   ~ACE/ARB   ~repeat troponin until down trending   ~EKG as clinically needed  5. After review of these tests, further recommendations regarding care will be given.   6.  At this time, I do

## 2022-06-24 NOTE — ED PROVIDER NOTES
I independently performed a history and physical on Colleen Araujo. All diagnostic, treatment, and disposition decisions were made by myself in conjunction with the advanced practice provider. I personally saw the patient and performed a substantive portion of the visit including all aspects of the medical decision making. For further details of [address] [facility] emergency department encounter, please see Silvia Gutierrez NP's documentation. Patient is a 24-year-old female presenting today due to concern for significant chest pain feeling like somebody was sitting on her chest with trouble breathing and think she was going to pass out earlier today while at work. She had a prior myocardial infarction and states this felt similar. She currently is pain-free. She had a mild headache earlier today as well but denies any falls or trauma. No numbness or weakness in the arms or legs. No pain with breathing. No fever. Due to significant chest discomfort that is mostly resolved now, she came to the ED for further evaluation. Physical:   Gen: No acute distress. AOx3. Psych: Normal mood and affect  HEENT: NCAT  Neck: supple  Cardiac: RRR, pulses 2+ in upper extremities, no calf swelling or tenderness bilaterally  Lungs: C2AB, no R/R/W  Abdomen: soft and nontender with no R/D/G  Neuro: Moving all extremities equally, GCS equals 15    The Ekg interpreted by me shows  normal sinus rhythm with a rate of 68  Axis is   Normal  QTc is  normal  Intervals and Durations are unremarkable. ST Segments: no acute change and nonspecific changes  No significant change from prior EKG dated - 12/21/19  No STEMI       MDM: Patient was evaluated due to significant chest discomfort associated with heaviness along with shortness of breath similar to prior history of MI. She was pain-free when I saw her. Initial troponin was negative. She will need further evaluation in the hospital with cardiac testing.   She was

## 2022-06-24 NOTE — ED PROVIDER NOTES
[facility] Emergency Department    CHIEF COMPLAINT  Chest Pain (started an hour ago at work, [facility] someone is sitting on me\". history of MI in 2018.)      HISTORY OF PRESENT ILLNESS  Radha Casey is a 50 y.o. female with a pertinent history of coronary artery disease, hypertension, hyperlipidemia, coronary stent placement who presents to the ED complaining of chest pain and shortness of breath. Patient reports symptoms started approximately 1 hour prior to arrival.  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 Patient describes chest pain as a pressure/heaviness with associated shortness of breath. Patient also reports some arm weakness and pain. Patient also complaining of right jaw pain she reports that she thought she may have had a right \"earache\" she was having ear and right-sided jaw pain 1 week ago as well as generalized fatigue, weakness and some arm pain at that time she was evaluated and told that her \"ear looked fine. \"  Patient does have a history of coronary artery disease, and MI, and one cardiac stent that was placed in 2018. Patient reports that the symptoms do feel similar, but more mild. Patient denies any recent travel, illness, fever, cough, headache, neck pain, nasal congestion, cough, mopped assist, palpitations, syncope, nausea, vomiting, diaphoresis, diarrhea, urinary complaints, leg swelling or calf pain. No other complaints, modifying factors or associated symptoms. Nursing notes reviewed.    Past Medical History:   Diagnosis Date    CAD (coronary artery disease)     Depression     Hyperlipidemia     Hypertension      Past Surgical History:   Procedure Laterality Date     SECTION      CORONARY ANGIOPLASTY WITH STENT PLACEMENT  2018    CYSTOSCOPY N/A 2020    URETHRAL DILATATION    CYSTOSCOPY N/A 2020    CYSTOSCOPY, URETHRAL DILATATION performed by Leda Chandra Francis Ramirez MD at [facility] LAB PROCEDURE      DILATION AND CURETTAGE OF UTERUS      HYSTERECTOMY (CERVIX STATUS UNKNOWN)      PTCA       Family History   Problem Relation Age of Onset    Heart Surgery Father      Social History     Socioeconomic History    Marital status:      Spouse name: Not on file    Number of children: Not on file    Years of education: Not on file    Highest education level: Not on file   Occupational History    Not on file   Tobacco Use    Smoking status: Former Smoker     Packs/day: 0.25     Types: Cigarettes     Quit date: 12/2018     Years since quitting: 3.5    Smokeless tobacco: Never Used   Vaping Use    Vaping Use: Never used   Substance and Sexual Activity    Alcohol use: Yes     Alcohol/week: 1.0 standard drink     Types: 1 Glasses of wine per week     Comment: socially    Drug use: No    Sexual activity: Yes     Partners: Male   Other Topics Concern    Not on file   Social History Narrative    Not on file     Social Determinants of Health     Financial Resource Strain:     Difficulty of Paying Living Expenses: Not on file   Food Insecurity:     Worried About Running Out of Food in the Last Year: Not on file    Joe of Food in the Last Year: Not on file   Transportation Needs:     Lack of Transportation (Medical): Not on file    Lack of Transportation (Non-Medical):  Not on file   Physical Activity:     Days of Exercise per Week: Not on file    Minutes of Exercise per Session: Not on file   Stress:     Feeling of Stress : Not on file   Social Connections:     Frequency of Communication with Friends and Family: Not on file    Frequency of Social Gatherings with Friends and Family: Not on file    Attends Nondenominational Services: Not on file    Active Member of Clubs or Organizations: Not on file    Attends Club or Organization Meetings: Not on file    Marital Status: Not on file   Intimate Partner Violence:     Fear of Current or Ex-Partner: Not on file    Emotionally Abused: Not on file    Physically Abused: Not on file    Sexually Abused: Not on file   Housing Stability:     Unable to Pay for Housing in the Last Year: Not on file    Number of [place] in the Last Year: Not on file    Unstable Housing in the Last Year: Not on file     Current Facility-Administered Medications   Medication Dose Route Frequency Provider Last Rate Last Admin    nitroGLYCERIN (NITROSTAT) SL tablet 0.4 mg  0.4 mg SubLINGual Q5 Min PRN DAVID Sheikh CNP        0.9 % sodium chloride bolus  1,000 mL IntraVENous Once Ma DAVID Babcock - CNP 1,000 mL/hr at 06/24/22 1147 1,000 mL at 06/24/22 1147     Current Outpatient Medications   Medication Sig Dispense Refill    omeprazole (PRILOSEC) 20 MG delayed release capsule Take 20 mg by mouth daily      FLUoxetine (PROZAC) 20 MG capsule Take 40 mg by mouth daily      carvedilol (COREG) 3.125 MG tablet TAKE 1 TABLET TWICE A  tablet 3    rosuvastatin (CRESTOR) 40 MG tablet TAKE 1 TABLET DAILY 90 tablet 3    Levocetirizine Dihydrochloride (XYZAL PO) Take by mouth      Fluticasone Propionate (FLONASE NA) by Nasal route      [employer] ASPIRIN ADULT LOW DOSE 81 MG chewable tablet TAKE 1 TABLET BY MOUTH EVERY DAY 90 tablet 3     Allergies   Allergen Reactions    Lisinopril      cough    Sulfa Antibiotics        REVIEW OF SYSTEMS  10 systems reviewed, pertinent positives per HPI otherwise noted to be negative    PHYSICAL EXAM  BP (!) 147/82   Pulse 73   Temp 97.7 °F (36.5 °C) (Oral)   Resp 15   Ht 5' 4\" (1.626 m)   Wt 165 lb (74.8 kg)   SpO2 99%   BMI 28.32 kg/m²   GENERAL APPEARANCE: Awake and alert. Cooperative. No acute distress. Vital signs are stable. Well appearing and non toxic. HEAD: Normocephalic. Atraumatic. EYES: PERRL. EOM's grossly intact. ENT: Mucous membranes are moist.   NECK: Supple. Normal ROM. HEART: RRR. Distal pulses are equal and intact.  Cap refill less than 2 seconds. LUNGS: Respirations unlabored. CTAB. Good air exchange. Speaking comfortably in full sentences. No wheezing, rhonchi, rales, stridor. CHEST: Nontender  ABDOMEN: Soft. Non-distended. Non-tender. No guarding or rebound. No rigidity. Bowel sounds are present. Negative ramirez's. Negative McBurney's point. Negative CVA tenderness. EXTREMITIES: No peripheral edema. Moves all extremities equally. All extremities neurovascularly intact. SKIN: Warm and dry. No acute rashes. NEUROLOGICAL: Alert and oriented. No gross facial drooping. Strength 5/5, sensation intact. PSYCHIATRIC: Normal mood and affect. SCREENINGS       RADIOLOGY  XR CHEST (2 VW)    Result Date: 6/24/2022  EXAMINATION: TWO XRAY VIEWS OF THE CHEST 6/24/2022 11:15 am COMPARISON: Chest x-ray dated 07/13/2018. HISTORY: ORDERING SYSTEM PROVIDED HISTORY: Chest Discomfort TECHNOLOGIST PROVIDED HISTORY: Reason for exam:->Chest Discomfort Reason for Exam: chest discomfort FINDINGS: HEART/MEDIASTINUM: The cardiomediastinal silhouette is within normal limits. PLEURA/LUNGS: There are no focal consolidations or pleural effusions. There is no appreciable pneumothorax. BONES/SOFT TISSUE: No acute abnormality. No radiographic evidence of acute pulmonary disease. SEPSIS    Is this patient to be included in the SEP-1 Core Measure due to severe sepsis or septic shock? No       CONSULTS  IP CONSULT TO CARDIOLOGY    ED COURSE/MDM  Patient seen and evaluated. Old records reviewed. Diagnostic testing reviewed and results discussed. I have seen this patient in collaboration with supervising physician Dr. Gabino Anne. We thoroughly discussed the history, physical exam, diagnostic testing and emergency department course. Bryan Arce presented to the ED today with above noted complaints. Upon arrival to the emergency department patient rates her chest pressure a 5 out of 10.   Patient's initial vital signs are stable afebrile with a blood pressure of 147/82, heart rate 73, oxygen saturation 99% on room air. No respiratory distress. Patient had taken an 81 mg aspirin this morning with her routine medications. EKG interpreted by my attending physician. Normal sinus rhythm. No ST elevation or sign of ischemia. Initial blood work including troponin unremarkable. Troponin less than 0.01. No anemia, leukocytosis, bandemia, AGUSTIN or electrolyte abnormality. Chest x-ray shows no radiographic evidence of acute pulmonary disease. Cardiology consulted I spoke with Mikhail Damon who agrees with admission for further cardiac work-up and plans to do a stress test in the morning. While in ED patient received   Medications   nitroGLYCERIN (NITROSTAT) SL tablet 0.4 mg (has no administration in time range)   0.9 % sodium chloride bolus (1,000 mLs IntraVENous New Bag 6/24/22 1147)   aspirin chewable tablet 243 mg (243 mg Oral Given 6/24/22 1144)       A discussion was had with the patient and/or their surrogate regarding diagnosis, diagnostic testing results, treatment/ plan of care. There was shared decision-making between myself as well as the patient and/or their surrogate and we are all in agreement with hospital admission. There was an opportunity for questions and all questions were answered to the best of my ability and to the satisfaction of the patient and/or patient family.      Results for orders placed or performed during the hospital encounter of 06/24/22   CBC with Auto Differential   Result Value Ref Range    WBC 5.0 4.0 - 11.0 K/uL    RBC 4.46 4.00 - 5.20 M/uL    Hemoglobin 13.9 12.0 - 16.0 g/dL    Hematocrit 41.1 36.0 - 48.0 %    MCV 92.1 80.0 - 100.0 fL    MCH 31.2 26.0 - 34.0 pg    MCHC 33.9 31.0 - 36.0 g/dL    RDW 13.0 12.4 - 15.4 %    Platelets 617 094 - 112 K/uL    MPV 8.8 5.0 - 10.5 fL    Neutrophils % 51.4 %    Lymphocytes % 32.8 %    Monocytes % 12.1 %    Eosinophils % 3.0 %    Basophils % 0.7 %    Neutrophils Absolute 2.5 1.7 - 7.7 K/uL    Lymphocytes Absolute 1.6 1.0 - 5.1 K/uL    Monocytes Absolute 0.6 0.0 - 1.3 K/uL    Eosinophils Absolute 0.1 0.0 - 0.6 K/uL    Basophils Absolute 0.0 0.0 - 0.2 K/uL   Comprehensive Metabolic Panel   Result Value Ref Range    Sodium 141 136 - 145 mmol/L    Potassium 3.6 3.5 - 5.1 mmol/L    Chloride 104 99 - 110 mmol/L    CO2 28 21 - 32 mmol/L    Anion Gap 9 3 - 16    Glucose 84 70 - 99 mg/dL    BUN 15 7 - 20 mg/dL    CREATININE 0.7 0.6 - 1.1 mg/dL    GFR Non-African American >60 >60    GFR African American >60 >60    Calcium 9.7 8.3 - 10.6 mg/dL    Total Protein 6.9 6.4 - 8.2 g/dL    Albumin 4.1 3.4 - 5.0 g/dL    Albumin/Globulin Ratio 1.5 1.1 - 2.2    Total Bilirubin 0.4 0.0 - 1.0 mg/dL    Alkaline Phosphatase 80 40 - 129 U/L    ALT 20 10 - 40 U/L    AST 19 15 - 37 U/L   Troponin   Result Value Ref Range    Troponin <0.01 <0.01 ng/mL   Brain Natriuretic Peptide   Result Value Ref Range    Pro-BNP 35 0 - 124 pg/mL   Troponin   Result Value Ref Range    Troponin <0.01 <0.01 ng/mL   EKG 12 Lead   Result Value Ref Range    Ventricular Rate 68 BPM    Atrial Rate 68 BPM    P-R Interval 148 ms    QRS Duration 78 ms    Q-T Interval 410 ms    QTc Calculation (Bazett) 435 ms    P Axis 51 degrees    R Axis 21 degrees    T Axis 61 degrees    Diagnosis       Normal sinus rhythmNormal ECGConfirmed by Stephanie Zhao MD, [facility] ([id number]) on 6/24/2022 1:24:07 PM       I spoke with Dr. Byron Dawn. We thoroughly discussed the history, physical exam, laboratory and imaging studies, as well as, emergency department course. Based upon that discussion, we've decided to admit Miranda Luther for further observation and evaluation of Darwin Hopkins's chest pain.   As I have deemed necessary from their history, physical, and studies, I have considered and evaluated Miranda Luther for the following diagnoses:  ACUTE CORONARY SYNDROME, PERICARDIAL TAMPONADE, PNEUMOTHORAX, PULMONARY EMBOLISM, and THORACIC DISSECTION. FINAL IMPRESSION  1. Chest pain, unspecified type        Vitals:  Blood pressure 124/78, pulse 77, temperature 97.9 °F (36.6 °C), temperature source Oral, resp. rate 16, height 5' 4\" (1.626 m), weight 165 lb (74.8 kg), SpO2 96 %, not currently breastfeeding. DISPOSITION  Patient was admitted in stable condition. Comment: Please note this report has been produced using speech recognition software and may contain errors related to that system including errors in grammar, punctuation, and spelling, as well as words and phrases that may be inappropriate. If there are any questions or concerns please feel free to contact the dictating provider for clarification.             DAVID Archuleta - STEVE  06/24/22 2146

## 2022-06-24 NOTE — ED NOTES
1431 - Dr Marcelo Briones in ED speaking with JAHAIRA Ackerman at this time  Re: chest pain     Kamila García  06/24/22 [id number]

## 2022-06-25 ENCOUNTER — APPOINTMENT (OUTPATIENT)
Dept: NUCLEAR MEDICINE | Age: 47
End: 2022-06-25
Payer: COMMERCIAL

## 2022-06-25 VITALS
BODY MASS INDEX: 28.17 KG/M2 | WEIGHT: 165 LBS | RESPIRATION RATE: 18 BRPM | HEART RATE: 77 BPM | SYSTOLIC BLOOD PRESSURE: 153 MMHG | TEMPERATURE: 97.7 F | DIASTOLIC BLOOD PRESSURE: 92 MMHG | OXYGEN SATURATION: 95 % | HEIGHT: 64 IN

## 2022-06-25 LAB
LV EF: 76 %
LVEF MODALITY: NORMAL
TROPONIN: <0.01 NG/ML
TROPONIN: <0.01 NG/ML

## 2022-06-25 PROCEDURE — A9502 TC99M TETROFOSMIN: HCPCS | Performed by: INTERNAL MEDICINE

## 2022-06-25 PROCEDURE — 6360000002 HC RX W HCPCS: Performed by: INTERNAL MEDICINE

## 2022-06-25 PROCEDURE — G0378 HOSPITAL OBSERVATION PER HR: HCPCS

## 2022-06-25 PROCEDURE — 3430000000 HC RX DIAGNOSTIC RADIOPHARMACEUTICAL: Performed by: INTERNAL MEDICINE

## 2022-06-25 PROCEDURE — 93017 CV STRESS TEST TRACING ONLY: CPT

## 2022-06-25 PROCEDURE — 84484 ASSAY OF TROPONIN QUANT: CPT

## 2022-06-25 PROCEDURE — 96372 THER/PROPH/DIAG INJ SC/IM: CPT

## 2022-06-25 PROCEDURE — 36415 COLL VENOUS BLD VENIPUNCTURE: CPT

## 2022-06-25 PROCEDURE — 78452 HT MUSCLE IMAGE SPECT MULT: CPT

## 2022-06-25 PROCEDURE — 2580000003 HC RX 258: Performed by: INTERNAL MEDICINE

## 2022-06-25 PROCEDURE — 99226 PR SBSQ OBSERVATION CARE/DAY 35 MINUTES: CPT | Performed by: INTERNAL MEDICINE

## 2022-06-25 PROCEDURE — 6370000000 HC RX 637 (ALT 250 FOR IP): Performed by: INTERNAL MEDICINE

## 2022-06-25 RX ADMIN — Medication 10 ML: at 11:22

## 2022-06-25 RX ADMIN — CARVEDILOL 3.12 MG: 3.12 TABLET, FILM COATED ORAL at 11:22

## 2022-06-25 RX ADMIN — ENOXAPARIN SODIUM 40 MG: 100 INJECTION SUBCUTANEOUS at 11:21

## 2022-06-25 RX ADMIN — FLUOXETINE 40 MG: 20 CAPSULE ORAL at 11:22

## 2022-06-25 RX ADMIN — ASPIRIN 81 MG: 81 TABLET, CHEWABLE ORAL at 11:21

## 2022-06-25 RX ADMIN — ROSUVASTATIN CALCIUM 40 MG: 10 TABLET, FILM COATED ORAL at 11:21

## 2022-06-25 RX ADMIN — TETROFOSMIN 31.2 MILLICURIE: 1.38 INJECTION, POWDER, LYOPHILIZED, FOR SOLUTION INTRAVENOUS at 10:25

## 2022-06-25 RX ADMIN — TETROFOSMIN 10.8 MILLICURIE: 1.38 INJECTION, POWDER, LYOPHILIZED, FOR SOLUTION INTRAVENOUS at 09:00

## 2022-06-25 ASSESSMENT — PAIN SCALES - GENERAL: PAINLEVEL_OUTOF10: 0

## 2022-06-25 NOTE — DISCHARGE SUMMARY
Hospital Medicine Discharge Summary    Patient ID: Colleen Araujo      Patient's PCP: Karthik Wolfe, APRN - CNP    Admit Date: 6/24/2022     Discharge Date: 6/25/2022      Admitting Provider: Aquiles Noonan MD     Discharge Provider: Aquiles Noonan MD     Discharge Diagnoses: Active Hospital Problems    Diagnosis     Chest pain [R07.9]        The patient was seen and examined on day of discharge and this discharge summary is in conjunction with any daily progress note from day of discharge. Hospital Course:     History Of Present Illness:     51 y.o. female with hx of cad,htn, hld, depression who presented to [facility] with chest pain. Pt woke up to go to work around 630am and drove in w/o issues. She was sitting at her desk when she became lightheaded/dizzy and developed chest heaviness/pressure (as if someone sitting on it), at 5/10 severity. She couldn't catch her breath. She also thought she noted heart beat pauses. Her discomfort was radiating to her jaw and also noted weakness in both arms. No n/v/sweating. She felt this was similar presentation to 2018 when she needed cardiac stent.  -she drove herself in. Asa given in ER.       Chest pain- resolved, concern for cardiac etiology,  -tele monitored  -Trend elías and neg  -Cards consulted given prior hx  -gxt myoview ordered and neg for ischemia  -prn morphine/nitro ordered  -on home asa/statin     CAD- with hx of stent of first diagonal in 2018  -continued home meds     HTN-stable  On coreg     Depression- on prozac     HLD- on statin      Physical Exam Performed:     BP (!) 153/92   Pulse 77   Temp 97.7 °F (36.5 °C) (Oral)   Resp 18   Ht 5' 4\" (1.626 m)   Wt 165 lb (74.8 kg)   SpO2 95%   BMI 28.32 kg/m²       General appearance:  No apparent distress, appears stated age and cooperative. HEENT:  Normal cephalic, atraumatic without obvious deformity. Pupils equal, round, and reactive to light. Extra ocular muscles intact. dailyHistorical Med      FLUoxetine (PROZAC) 20 MG capsule Take 40 mg by mouth dailyHistorical Med      carvedilol (COREG) 3.125 MG tablet TAKE 1 TABLET TWICE A DAY, Disp-180 tablet, R-3Normal      rosuvastatin (CRESTOR) 40 MG tablet TAKE 1 TABLET DAILY, Disp-90 tablet, R-3Normal      Levocetirizine Dihydrochloride (XYZAL PO) Take by mouthHistorical Med      Fluticasone Propionate (FLONASE NA) by Nasal routeHistorical Med      [employer] ASPIRIN ADULT LOW DOSE 81 MG chewable tablet TAKE 1 TABLET BY MOUTH EVERY DAY, Disp-90 tablet, R-3Normal             Time Spent on discharge is more than 30 minutes in the examination, evaluation, counseling and review of medications and discharge plan. Signed:    Randi Toro MD   6/25/2022      Thank you DAVID Hinojosa - STEVE for the opportunity to be involved in this patient's care. If you have any questions or concerns, please feel free to contact me at [phone].

## 2022-06-25 NOTE — PROGRESS NOTES
A GXT Myoview stress test was completed on this patient as ordered. The patient met her THR and tolerated the procedure well.

## 2022-06-25 NOTE — PROGRESS NOTES
Discharged with documented belongings. Tele removed and returned. [facility] aware. IV removed without complication. Ambulatory to ER where car parked.

## 2022-06-25 NOTE — PROGRESS NOTES
[facility]   Progress Note  Cardiology    CC: chest pain     HPI: no chest pain     Past Medical History   has a past medical history of CAD (coronary artery disease), Depression, Hyperlipidemia, and Hypertension. Past Surgical History   has a past surgical history that includes  section; Hysterectomy; Dilation and curettage of uterus; Diagnostic Cardiac Cath Lab Procedure; Percutaneous Transluminal Coronary Angio; Coronary angioplasty with stent (2018); Cystoscopy (N/A, 2020); and Cystoscopy (N/A, 2020). Social History   reports that she quit smoking about 3 years ago. Her smoking use included cigarettes. She smoked 0.25 packs per day. She has never used smokeless tobacco. She reports current alcohol use of about 1.0 standard drink of alcohol per week. She reports that she does not use drugs. Family History  family history includes Heart Surgery in her father. Medications  Prior to Admission medications    Medication Sig Start Date End Date Taking? Authorizing Provider   omeprazole (PRILOSEC) 20 MG delayed release capsule Take 20 mg by mouth daily 6/22/15   Historical Provider, MD   FLUoxetine (PROZAC) 20 MG capsule Take 40 mg by mouth daily    Historical Provider, MD   carvedilol (COREG) 3.125 MG tablet TAKE 1 TABLET TWICE A DAY 21   Brian Cintron MD   rosuvastatin (CRESTOR) 40 MG tablet TAKE 1 TABLET DAILY 21   Brian Cintron MD   Levocetirizine Dihydrochloride (XYZAL PO) Take by mouth    Historical Provider, MD   Fluticasone Propionate (FLONASE NA) by Nasal route    Historical Provider, MD   [employer] ASPIRIN ADULT LOW DOSE 81 MG chewable tablet TAKE 1 TABLET BY MOUTH EVERY DAY 18   Brian Cintron MD       Allergies  Lisinopril and Sulfa antibiotics    Review of Systems:   Reviewed.  No changes except as noted in HPI and A/P      Lab Results   Component Value Date    WBC 5.0 2022    HGB 13.9 2022    HCT 41.1 2022    MCV 92.1 06/24/2022     06/24/2022     Lab Results   Component Value Date    CREATININE 0.7 06/24/2022    BUN 15 06/24/2022     06/24/2022    K 3.6 06/24/2022     06/24/2022    CO2 28 06/24/2022     Lab Results   Component Value Date    INR 1.25 (H) 11/07/2018    PROTIME 14.1 (H) 11/07/2018       I reviewed EKGs and radiology imaging. Pertinent findings and changes as described in assessment below. Physical Examination:    /84   Pulse 64   Temp 97.4 °F (36.3 °C) (Oral)   Resp 16   Ht 5' 4\" (1.626 m)   Wt 165 lb (74.8 kg)   SpO2 95%   BMI 28.32 kg/m²      General Appearance:  Alert, no distress, appears stated age   Head:  Normocephalic, without obvious abnormality, atraumatic   Eyes:  PERRL, conjunctiva/corneas clear         Nose: Nares normal, no drainage or sinus tenderness   Throat: Lips, mucosa, and tongue normal   Neck: Supple, symmetrical, trachea midline, no adenopathy         Lungs:   Clear to auscultation bilaterally    Chest Wall:  No tenderness or deformity   Heart:  Regular rate and rhythm, S1, S2 normal, no murmur, rub or gallop   Abdomen:   Soft, non tender, normal bowel sounds                Extremities: No cyanosis or edema   Pulses: 2+ and symmetric   Skin: Skin color, texture, turgor normal, no rashes    Pysch: Normal mood and affect   Neurologic: Normal gross motor and sensory exam.      Tele: NSR  Reviewed: CXR, EKG, trop, CBC, BMP, old records   Discussed w/ RN    Assessment:    Chest pain - no recurrence.  Typical/atypical features  CAD - possible progression  HTN - stable  HLD - stable    Plan  GXT kimberly today  Cont ASA, BBlk, ASA      Larissa Thomas MD, 6/25/2022 11:03 AM

## 2022-07-01 DIAGNOSIS — E78.00 PURE HYPERCHOLESTEROLEMIA: ICD-10-CM

## 2022-07-01 RX ORDER — ROSUVASTATIN CALCIUM 40 MG/1
TABLET, COATED ORAL
Qty: 90 TABLET | Refills: 5 | Status: SHIPPED | OUTPATIENT
Start: 2022-07-01 | End: 2022-08-29 | Stop reason: SDUPTHER

## 2022-07-14 ENCOUNTER — HOSPITAL ENCOUNTER (OUTPATIENT)
Dept: SLEEP CENTER | Age: 47
Discharge: HOME | End: 2022-07-14
Payer: COMMERCIAL

## 2022-07-14 DIAGNOSIS — G47.30 OBSERVED SLEEP APNEA: ICD-10-CM

## 2022-07-14 DIAGNOSIS — R06.83 SNORING: ICD-10-CM

## 2022-07-14 DIAGNOSIS — G47.10 HYPERSOMNIA: ICD-10-CM

## 2022-07-14 DIAGNOSIS — I10 ESSENTIAL HYPERTENSION: ICD-10-CM

## 2022-07-14 DIAGNOSIS — R53.83 OTHER FATIGUE: ICD-10-CM

## 2022-07-14 PROCEDURE — 95806 SLEEP STUDY UNATT&RESP EFFT: CPT

## 2022-07-16 PROCEDURE — 95806 SLEEP STUDY UNATT&RESP EFFT: CPT | Performed by: INTERNAL MEDICINE

## 2022-07-18 PROBLEM — G47.10 HYPERSOMNIA: Status: ACTIVE | Noted: 2022-07-18

## 2022-07-18 PROBLEM — R53.83 OTHER FATIGUE: Status: ACTIVE | Noted: 2022-07-18

## 2022-07-18 PROBLEM — G47.30 OBSERVED SLEEP APNEA: Status: ACTIVE | Noted: 2022-07-18

## 2022-07-18 PROBLEM — R06.83 SNORING: Status: ACTIVE | Noted: 2022-07-18

## 2022-07-20 ENCOUNTER — TELEPHONE (OUTPATIENT)
Dept: SLEEP MEDICINE | Age: 47
End: 2022-07-20

## 2022-07-20 DIAGNOSIS — G47.33 MILD OBSTRUCTIVE SLEEP APNEA: Primary | ICD-10-CM

## 2022-08-29 DIAGNOSIS — E78.00 PURE HYPERCHOLESTEROLEMIA: ICD-10-CM

## 2022-08-29 RX ORDER — ROSUVASTATIN CALCIUM 40 MG/1
TABLET, COATED ORAL
Qty: 90 TABLET | Refills: 1 | Status: SHIPPED | OUTPATIENT
Start: 2022-08-29

## 2022-08-29 RX ORDER — CARVEDILOL 3.12 MG/1
TABLET ORAL
Qty: 180 TABLET | Refills: 1 | Status: SHIPPED | OUTPATIENT
Start: 2022-08-29

## 2022-08-31 DIAGNOSIS — E78.00 PURE HYPERCHOLESTEROLEMIA: ICD-10-CM

## 2022-08-31 RX ORDER — CARVEDILOL 3.12 MG/1
TABLET ORAL
Qty: 180 TABLET | Refills: 1 | OUTPATIENT
Start: 2022-08-31

## 2022-08-31 RX ORDER — ROSUVASTATIN CALCIUM 40 MG/1
TABLET, COATED ORAL
Qty: 90 TABLET | Refills: 1 | OUTPATIENT
Start: 2022-08-31

## 2022-09-11 ENCOUNTER — HOSPITAL ENCOUNTER (OUTPATIENT)
Dept: SLEEP CENTER | Age: 47
Discharge: HOME | End: 2022-09-11
Payer: COMMERCIAL

## 2022-09-11 DIAGNOSIS — G47.33 MILD OBSTRUCTIVE SLEEP APNEA: ICD-10-CM

## 2022-09-11 PROCEDURE — 95811 POLYSOM 6/>YRS CPAP 4/> PARM: CPT

## 2022-09-12 PROBLEM — G47.33 MILD OBSTRUCTIVE SLEEP APNEA: Status: ACTIVE | Noted: 2022-09-12

## 2022-09-12 PROCEDURE — 95811 POLYSOM 6/>YRS CPAP 4/> PARM: CPT | Performed by: INTERNAL MEDICINE

## 2022-09-14 ENCOUNTER — TELEPHONE (OUTPATIENT)
Dept: PULMONOLOGY | Age: 47
End: 2022-09-14

## 2022-09-14 DIAGNOSIS — G47.30 OBSERVED SLEEP APNEA: Primary | ICD-10-CM

## 2022-09-14 DIAGNOSIS — G47.10 HYPERSOMNIA: ICD-10-CM

## 2022-09-14 NOTE — TELEPHONE ENCOUNTER
Orders pending    PAP orders. Need faxed, with testing/OV note/demographics/insurance, once signed, to Vianey PEDERSEN

## 2022-09-14 NOTE — TELEPHONE ENCOUNTER
Chart/orders reviewed and signed. It looks like patient has 231-90-day follow-up appointments. Can cancel the one that is too soon unless patient prefers to keep.

## 2022-09-14 NOTE — TELEPHONE ENCOUNTER
Left message asking patient to return call to office about cancelling her appt this month. Orders, note, demo, insurance, and studies all printed and faxed.

## 2022-10-07 NOTE — PROGRESS NOTES
[facility]   Cardiovascular Evaluation    PATIENT: Radha Petersen  DATE: 10/11/2022  MRN: [id number]  CSN: [id number]  : 1975    Primary Care Doctor: DAVID Yun CNP    Reason for evaluation/Chief complaint:   Follow-up, Coronary Artery Disease, and Hypertension    Subjective:    History of present illness on initial date of evaluation:   Radha Petersen is a 50 y.o. patient who presents for cardiology follow up. She has a past medical history of coronary artery disease status post percutaneous coronary intervention to first diagonal with drug-eluting stent 2018. She also has a history of hypertension and hyperlipidemia. Since last office visit she was admitted to the hospital -22 with complaints of chest pain. She reported similar symptoms prior to stent placement. Troponin negative. EKG negative for ACS. GXT stress myoview performed 22 normal perfusion study. Today she states she is doing well. She states is retrospect her ED visit in  was possibly related to stress and stopping an antidepressant. Patient currently denies any weight gain, edema, palpitations, chest pain, shortness of breath, dizziness, and syncope. She is taking all medications as prescribed, tolerating well. She states she was recently diagnosed with sleep apnea. She reports she is compliant with CPAP.      Patient Active Problem List   Diagnosis    Acute ST elevation myocardial infarction (STEMI) involving left anterior descending (LAD) coronary artery without development of Q waves (HCC)    STEMI (ST elevation myocardial infarction) ([facility] [address] 18 PCI to DIAG     Chest pain    Coronary artery disease involving native coronary artery of native heart without angina pectoris    Shortness of breath    Essential hypertension    Tobacco abuse    Pure hypercholesterolemia    Snoring    Hypersomnia    Observed sleep apnea    Other fatigue    Mild obstructive sleep apnea Cardiac Testing: I have reviewed the findings below. EKG:  ECHO:   STRESS TEST:  CATH:  BYPASS:  VASCULAR:    Past Medical History:   has a past medical history of CAD (coronary artery disease), Depression, Hyperlipidemia, and Hypertension. Surgical History:   has a past surgical history that includes  section; Hysterectomy; Dilation and curettage of uterus; Diagnostic Cardiac Cath Lab Procedure; Percutaneous Transluminal Coronary Angio; Coronary angioplasty with stent (2018); Cystoscopy (N/A, 2020); and Cystoscopy (N/A, 2020). Social History:   reports that she quit smoking about 3 years ago. Her smoking use included cigarettes. She smoked an average of .25 packs per day. She has never used smokeless tobacco. She reports current alcohol use of about 1.0 standard drink per week. She reports that she does not use drugs. Family History:  No evidence for sudden cardiac death or premature CAD    Medications:  Reviewed and are listed in nursing record. and/or listed below  Outpatient Medications:  Prior to Admission medications    Medication Sig Start Date End Date Taking? Authorizing Provider   carvedilol (COREG) 3.125 MG tablet TAKE 1 TABLET TWICE A DAY 22  Yes Sofia Calvo MD   rosuvastatin (CRESTOR) 40 MG tablet Take one tablet daily 22  Yes Sofia Calvo MD   omeprazole (PRILOSEC) 20 MG delayed release capsule Take 20 mg by mouth daily 6/22/15  Yes Historical Provider, MD   FLUoxetine (PROZAC) 20 MG capsule Take 40 mg by mouth daily   Yes Historical Provider, MD   Levocetirizine Dihydrochloride (XYZAL PO) Take by mouth   Yes Historical Provider, MD   Fluticasone Propionate (FLONASE NA) by Nasal route   Yes Historical Provider, MD   [employer] ASPIRIN ADULT LOW DOSE 81 MG chewable tablet TAKE 1 TABLET BY MOUTH EVERY DAY 18  Yes Sofia Calvo MD       In-patient schedule medications:        Infusion Medications:         Allergies:  Lisinopril and Sulfa antibiotics     Review of Systems:   All 14 point review of symptoms completed. Pertinent positives identified in the HPI, all other review of symptoms findings as below.      Review of Systems - History obtained from the patient  General ROS: negative for - chills, fever or night sweats  Psychological ROS: negative for - disorientation or hallucinations  Ophthalmic ROS: negative for - dry eyes, eye pain or loss of vision  ENT ROS: negative for - nasal discharge or sore throat  Allergy and Immunology ROS: negative for - hives or itchy/watery eyes  Hematological and Lymphatic ROS: negative for - jaundice or night sweats  Endocrine ROS: negative for - mood swings or temperature intolerance  Breast ROS: deferred  Respiratory ROS: negative for - hemoptysis or stridor  Cardiovascular ROS: negative for - chest pain, dyspnea on exertion or palpitations  Gastrointestinal ROS: no abdominal pain, change in bowel habits, or black or bloody stools  Genito-Urinary ROS: no dysuria, trouble voiding, or hematuria  Musculoskeletal ROS: negative for - gait disturbance, joint pain or joint stiffness  Neurological ROS: negative for - seizures or speech problems  Dermatological ROS: negative for - rash or skin lesion changes      Physical Examination:    /68   Pulse 89   Ht 5' 4\" (1.626 m)   Wt 172 lb (78 kg)   SpO2 97%   BMI 29.52 kg/m²   /68   Pulse 89   Ht 5' 4\" (1.626 m)   Wt 172 lb (78 kg)   SpO2 97%   BMI 29.52 kg/m²    Weight: 172 lb (78 kg)     Wt Readings from Last 3 Encounters:   10/11/22 172 lb (78 kg)   06/24/22 165 lb (74.8 kg)   12/03/21 167 lb (75.8 kg)     No intake or output data in the 24 hours ending 10/11/22 0935    General Appearance:  Alert, cooperative, no distress, appears stated age   Head:  Normocephalic, without obvious abnormality, atraumatic   Eyes:  PERRL, conjunctiva/corneas clear       Nose: Nares normal, no drainage or sinus tenderness   Throat: Lips, mucosa, and tongue normal Neck: Supple, symmetrical, trachea midline, no adenopathy, thyroid: not enlarged, symmetric, no tenderness/mass/nodules, no carotid bruit or JVD       Lungs:   Clear to auscultation bilaterally, respirations unlabored   Chest Wall:  No tenderness or deformity   Heart:  Regular rhythm and normal rate; S1, S2 are normal; no murmur noted; no rub or gallop   Abdomen:   Soft, non-tender, bowel sounds active all four quadrants,  no masses, no organomegaly           Extremities: Extremities normal, atraumatic, no cyanosis or edema   Pulses: 2+ and symmetric   Skin: Skin color, texture, turgor normal, no rashes or lesions   Pysch: Normal mood and affect   Neurologic: Normal gross motor and sensory exam.         Labs  No results for input(s): WBC, HGB, HCT, MCV, PLT in the last 72 hours. No results for input(s): CREATININE, BUN, NA, K, CL, CO2 in the last 72 hours. No results for input(s): INR, PROTIME in the last 72 hours. No results for input(s): TROPONINI in the last 72 hours. Invalid input(s): PRO-BNP  No results for input(s): CHOL, LDL, HDL in the last 72 hours. Invalid input(s): TG      Imaging:  I have reviewed the below testing personally and my interpretation is below. EKG 12/2019:  BPM    CXR:    ECHO 7/2018:   Normal left ventricular systolic function with ejection fraction of 65%. Mild concentric left ventricular hypertrophy. Grade I diastolic dysfunction with normal filling pressure. Trivial mitral regurgitation    [facility] 7/2018:  PROCEDURES PERFORMED:  1. Selective coronary angiography. 2.  Left heart catheterization. 3.  Left ventriculography. 4.  Percutaneous coronary intervention of the first diagonal with an [place]  drug-eluting stent. 5.  Angio-Seal device deployment. 6.  Critical care time of 40 minutes. ANGIOGRAPHY FINDINGS:  1.  A 99% stenosis of the first diagonal branch with mild disease of the  remaining coronary arteries.   2.  Normal left ventricular function with ejection fraction of 55% with  high lateral wall hypokinesis. 3.  Normal hemodynamics. Assessment:  50 y.o. patient with:   Diagnosis Orders   1. Coronary artery disease involving native coronary artery of native heart without angina pectoris        2. Pure hypercholesterolemia        3. Essential hypertension              Plan:  1. Patient given detailed instructions on addressing diet, regular exercise, weight control, smoking abstention, medication compliance, and stress minimization. The patient was provided written and verbal instructions regarding risk factor modification. 2. Discussed importance if you develop symptoms chest pain with exertion, worsening shortness of breath with normal activities, or any other concerning symptoms call office for further intervention. 3. Continue CPAP for sleep apnea ~ will monitor blood pressure overtime. 4. Follow up in one year or sooner if needed. Scribe's attestation: This note was scribed in the presence of Yariel Love by Martha Saha RN     I, Dr. Kelsey Vogt, personally performed the services described in this documentation, as scribed by the above signed scribe in my presence. It is both accurate and complete to my knowledge. I agree with the details independently gathered by the clinical support staff, while the remaining scribed note accurately describes my personal service to the patient. Medical Decision Making: The following items were considered in medical decision making:  Independent review of images  Review / order clinical lab tests  Review / order radiology tests  Decision to obtain old records  Review and summation of old records as accessed through [facility] (a summary of my findings in these old records)      Time Based Itemization  A total of 30 minutes was spent on today's patient encounter.   If applicable, non-patient-facing activities:  (X)Preparing to see the patient and reviewing records  (X) Individual interpretation of results  ( ) Discussion or coordination of care with other health care professionals  () Ordering of unique tests, medications, or procedures  (X) Documentation within the EHR              All questions and concerns were addressed to the patient/family. Alternatives to my treatment were discussed. The note was completed using EMR. Every effort was made to ensure accuracy; however, inadvertent computerized transcription errors may be present.     Radha Lynn MD, [address], [place], [place]  [phone] [facility] office  [phone] [facility]  10/11/2022  9:35 AM

## 2022-10-11 ENCOUNTER — OFFICE VISIT (OUTPATIENT)
Dept: CARDIOLOGY CLINIC | Age: 47
End: 2022-10-11
Payer: COMMERCIAL

## 2022-10-11 VITALS
HEART RATE: 89 BPM | BODY MASS INDEX: 29.37 KG/M2 | DIASTOLIC BLOOD PRESSURE: 68 MMHG | OXYGEN SATURATION: 97 % | SYSTOLIC BLOOD PRESSURE: 108 MMHG | HEIGHT: 64 IN | WEIGHT: 172 LBS

## 2022-10-11 DIAGNOSIS — E78.00 PURE HYPERCHOLESTEROLEMIA: ICD-10-CM

## 2022-10-11 DIAGNOSIS — I10 ESSENTIAL HYPERTENSION: ICD-10-CM

## 2022-10-11 DIAGNOSIS — I25.10 CORONARY ARTERY DISEASE INVOLVING NATIVE CORONARY ARTERY OF NATIVE HEART WITHOUT ANGINA PECTORIS: Primary | ICD-10-CM

## 2022-10-11 PROCEDURE — 99214 OFFICE O/P EST MOD 30 MIN: CPT | Performed by: INTERNAL MEDICINE

## 2022-10-11 NOTE — LETTER
Alba Garcia  1975      [address]   Cardiovascular Evaluation    PATIENT: Alba Garcia  DATE: 10/11/2022  MRN: [id number]  CSN: [id number]  : 1975    Primary Care Doctor: DAVID Moya CNP    Reason for evaluation/Chief complaint:   Follow-up, Coronary Artery Disease, and Hypertension    Subjective:    History of present illness on initial date of evaluation:   Alba Garcia is a 50 y.o. patient who presents for cardiology follow up. She has a past medical history of coronary artery disease status post percutaneous coronary intervention to first diagonal with drug-eluting stent 2018. She also has a history of hypertension and hyperlipidemia. Since last office visit she was admitted to the hospital -22 with complaints of chest pain. She reported similar symptoms prior to stent placement. Troponin negative. EKG negative for ACS. GXT stress myoview performed 22 normal perfusion study. Today she states she is doing well. She states is retrospect her ED visit in  was possibly related to stress and stopping an antidepressant. Patient currently denies any weight gain, edema, palpitations, chest pain, shortness of breath, dizziness, and syncope. She is taking all medications as prescribed, tolerating well. She states she was recently diagnosed with sleep apnea. She reports she is compliant with CPAP.      Patient Active Problem List   Diagnosis    Acute ST elevation myocardial infarction (STEMI) involving left anterior descending (LAD) coronary artery without development of Q waves ([address]    STEMI (ST elevation myocardial infarction) ([address] 18 PCI to DIAG     Chest pain    Coronary artery disease involving native coronary artery of native heart without angina pectoris    Shortness of breath    Essential hypertension    Tobacco abuse    Pure hypercholesterolemia    Snoring    Hypersomnia    Observed sleep apnea    Other fatigue    Mild obstructive sleep apnea         Cardiac Testing: I have reviewed the findings below. EKG:  ECHO:   STRESS TEST:  CATH:  BYPASS:  VASCULAR:    Past Medical History:   has a past medical history of CAD (coronary artery disease), Depression, Hyperlipidemia, and Hypertension. Surgical History:   has a past surgical history that includes  section; Hysterectomy; Dilation and curettage of uterus; Diagnostic Cardiac Cath Lab Procedure; Percutaneous Transluminal Coronary Angio; Coronary angioplasty with stent (2018); Cystoscopy (N/A, 2020); and Cystoscopy (N/A, 2020). Social History:   reports that she quit smoking about 3 years ago. Her smoking use included cigarettes. She smoked an average of .25 packs per day. She has never used smokeless tobacco. She reports current alcohol use of about 1.0 standard drink per week. She reports that she does not use drugs. Family History:  No evidence for sudden cardiac death or premature CAD    Medications:  Reviewed and are listed in nursing record. and/or listed below  Outpatient Medications:  Prior to Admission medications    Medication Sig Start Date End Date Taking?  Authorizing Provider   carvedilol (COREG) 3.125 MG tablet TAKE 1 TABLET TWICE A DAY 22  Yes Anum Zapata MD   rosuvastatin (CRESTOR) 40 MG tablet Take one tablet daily 22  Yes Anum Zapata MD   omeprazole (PRILOSEC) 20 MG delayed release capsule Take 20 mg by mouth daily 6/22/15  Yes Historical Provider, MD   FLUoxetine (PROZAC) 20 MG capsule Take 40 mg by mouth daily   Yes Historical Provider, MD   Levocetirizine Dihydrochloride (XYZAL PO) Take by mouth   Yes Historical Provider, MD   Fluticasone Propionate (FLONASE NA) by Nasal route   Yes Historical Provider, MD   [employer] ASPIRIN ADULT LOW DOSE 81 MG chewable tablet TAKE 1 TABLET BY MOUTH EVERY DAY 18  Yes Anum Zapata MD       In-patient schedule medications:        Infusion Medications: Allergies:  Lisinopril and Sulfa antibiotics     Review of Systems:   All 14 point review of symptoms completed. Pertinent positives identified in the HPI, all other review of symptoms findings as below.      Review of Systems - History obtained from the patient  General ROS: negative for - chills, fever or night sweats  Psychological ROS: negative for - disorientation or hallucinations  Ophthalmic ROS: negative for - dry eyes, eye pain or loss of vision  ENT ROS: negative for - nasal discharge or sore throat  Allergy and Immunology ROS: negative for - hives or itchy/watery eyes  Hematological and Lymphatic ROS: negative for - jaundice or night sweats  Endocrine ROS: negative for - mood swings or temperature intolerance  Breast ROS: deferred  Respiratory ROS: negative for - hemoptysis or stridor  Cardiovascular ROS: negative for - chest pain, dyspnea on exertion or palpitations  Gastrointestinal ROS: no abdominal pain, change in bowel habits, or black or bloody stools  Genito-Urinary ROS: no dysuria, trouble voiding, or hematuria  Musculoskeletal ROS: negative for - gait disturbance, joint pain or joint stiffness  Neurological ROS: negative for - seizures or speech problems  Dermatological ROS: negative for - rash or skin lesion changes      Physical Examination:    /68   Pulse 89   Ht 5' 4\" (1.626 m)   Wt 172 lb (78 kg)   SpO2 97%   BMI 29.52 kg/m²   /68   Pulse 89   Ht 5' 4\" (1.626 m)   Wt 172 lb (78 kg)   SpO2 97%   BMI 29.52 kg/m²    Weight: 172 lb (78 kg)     Wt Readings from Last 3 Encounters:   10/11/22 172 lb (78 kg)   06/24/22 165 lb (74.8 kg)   12/03/21 167 lb (75.8 kg)     No intake or output data in the 24 hours ending 10/11/22 0935    General Appearance:  Alert, cooperative, no distress, appears stated age   Head:  Normocephalic, without obvious abnormality, atraumatic   Eyes:  PERRL, conjunctiva/corneas clear       Nose: Nares normal, no drainage or sinus tenderness   Throat: Lips, mucosa, and tongue normal   Neck: Supple, symmetrical, trachea midline, no adenopathy, thyroid: not enlarged, symmetric, no tenderness/mass/nodules, no carotid bruit or JVD       Lungs:   Clear to auscultation bilaterally, respirations unlabored   Chest Wall:  No tenderness or deformity   Heart:  Regular rhythm and normal rate; S1, S2 are normal; no murmur noted; no rub or gallop   Abdomen:   Soft, non-tender, bowel sounds active all four quadrants,  no masses, no organomegaly           Extremities: Extremities normal, atraumatic, no cyanosis or edema   Pulses: 2+ and symmetric   Skin: Skin color, texture, turgor normal, no rashes or lesions   Pysch: Normal mood and affect   Neurologic: Normal gross motor and sensory exam.         Labs  No results for input(s): WBC, HGB, HCT, MCV, PLT in the last 72 hours. No results for input(s): CREATININE, BUN, NA, K, CL, CO2 in the last 72 hours. No results for input(s): INR, PROTIME in the last 72 hours. No results for input(s): TROPONINI in the last 72 hours. Invalid input(s): PRO-BNP  No results for input(s): CHOL, LDL, HDL in the last 72 hours. Invalid input(s): TG      Imaging:  I have reviewed the below testing personally and my interpretation is below. EKG 12/2019:  BPM    CXR:    ECHO 7/2018:   Normal left ventricular systolic function with ejection fraction of 65%. Mild concentric left ventricular hypertrophy. Grade I diastolic dysfunction with normal filling pressure. Trivial mitral regurgitation    [facility] 7/2018:  PROCEDURES PERFORMED:  1. Selective coronary angiography. 2.  Left heart catheterization. 3.  Left ventriculography. 4.  Percutaneous coronary intervention of the first diagonal with an [place]  drug-eluting stent. 5.  Angio-Seal device deployment. 6.  Critical care time of 40 minutes.      ANGIOGRAPHY FINDINGS:  1.  A 99% stenosis of the first diagonal branch with mild disease of the  remaining coronary arteries. 2.  Normal left ventricular function with ejection fraction of 55% with  high lateral wall hypokinesis. 3.  Normal hemodynamics. Assessment:  50 y.o. patient with:   Diagnosis Orders   1. Coronary artery disease involving native coronary artery of native heart without angina pectoris        2. Pure hypercholesterolemia        3. Essential hypertension              Plan:  1. Patient given detailed instructions on addressing diet, regular exercise, weight control, smoking abstention, medication compliance, and stress minimization. The patient was provided written and verbal instructions regarding risk factor modification. 2. Discussed importance if you develop symptoms chest pain with exertion, worsening shortness of breath with normal activities, or any other concerning symptoms call office for further intervention. 3. Continue CPAP for sleep apnea ~ will monitor blood pressure overtime. 4. Follow up in one year or sooner if needed. Scribe's attestation: This note was scribed in the presence of Rhonda Ponce by Shilpa Vera RN     I, Dr. Emily Ontiveros, personally performed the services described in this documentation, as scribed by the above signed scribe in my presence. It is both accurate and complete to my knowledge. I agree with the details independently gathered by the clinical support staff, while the remaining scribed note accurately describes my personal service to the patient. Medical Decision Making: The following items were considered in medical decision making:  Independent review of images  Review / order clinical lab tests  Review / order radiology tests  Decision to obtain old records  Review and summation of old records as accessed through [facility] (a summary of my findings in these old records)      Time Based Itemization  A total of 30 minutes was spent on today's patient encounter.   If applicable, non-patient-facing activities:  (X)Preparing to see the patient and reviewing records  (X) Individual interpretation of results  ( ) Discussion or coordination of care with other health care professionals  () Ordering of unique tests, medications, or procedures  (X) Documentation within the EHR              All questions and concerns were addressed to the patient/family. Alternatives to my treatment were discussed. The note was completed using EMR. Every effort was made to ensure accuracy; however, inadvertent computerized transcription errors may be present.     John Longoria MD, [address], [place], [place]  [phone] [facility]  [phone] [facility]  10/11/2022  9:35 AM

## 2022-10-11 NOTE — PATIENT INSTRUCTIONS
Plan:  1. Patient given detailed instructions on addressing diet, regular exercise, weight control, smoking abstention, medication compliance, and stress minimization. The patient was provided written and verbal instructions regarding risk factor modification. 2. Discussed importance if you develop symptoms chest pain with exertion, worsening shortness of breath with normal activities, or any other concerning symptoms call office for further intervention. 3. Continue CPAP for sleep apnea ~ will monitor blood pressure overtime. 4. Follow up in one year or sooner if needed.

## 2022-12-08 ENCOUNTER — HOSPITAL ENCOUNTER (OUTPATIENT)
Dept: MAMMOGRAPHY | Age: 47
Discharge: HOME | End: 2022-12-08
Payer: COMMERCIAL

## 2022-12-08 DIAGNOSIS — Z12.31 SCREENING MAMMOGRAM, ENCOUNTER FOR: ICD-10-CM

## 2022-12-08 PROCEDURE — 77063 BREAST TOMOSYNTHESIS BI: CPT

## 2022-12-16 ENCOUNTER — TELEPHONE (OUTPATIENT)
Dept: PULMONOLOGY | Age: 47
End: 2022-12-16

## 2022-12-16 ENCOUNTER — TELEMEDICINE (OUTPATIENT)
Dept: PULMONOLOGY | Age: 47
End: 2022-12-16
Payer: COMMERCIAL

## 2022-12-16 DIAGNOSIS — Z71.89 CPAP USE COUNSELING: ICD-10-CM

## 2022-12-16 DIAGNOSIS — R53.83 OTHER FATIGUE: ICD-10-CM

## 2022-12-16 DIAGNOSIS — G47.10 HYPERSOMNIA: ICD-10-CM

## 2022-12-16 DIAGNOSIS — G47.33 MILD OBSTRUCTIVE SLEEP APNEA: Primary | ICD-10-CM

## 2022-12-16 PROCEDURE — 99213 OFFICE O/P EST LOW 20 MIN: CPT | Performed by: NURSE PRACTITIONER

## 2022-12-16 ASSESSMENT — SLEEP AND FATIGUE QUESTIONNAIRES
ESS TOTAL SCORE: 4
HOW LIKELY ARE YOU TO NOD OFF OR FALL ASLEEP WHILE SITTING AND READING: 1
HOW LIKELY ARE YOU TO NOD OFF OR FALL ASLEEP WHILE SITTING QUIETLY AFTER LUNCH WITHOUT ALCOHOL: 1
HOW LIKELY ARE YOU TO NOD OFF OR FALL ASLEEP WHILE WATCHING TV: 0
HOW LIKELY ARE YOU TO NOD OFF OR FALL ASLEEP WHILE SITTING AND READING: 1
HOW LIKELY ARE YOU TO NOD OFF OR FALL ASLEEP WHILE WATCHING TV: 0
HOW LIKELY ARE YOU TO NOD OFF OR FALL ASLEEP WHEN YOU ARE A PASSENGER IN A CAR FOR AN HOUR WITHOUT A BREAK: 1
HOW LIKELY ARE YOU TO NOD OFF OR FALL ASLEEP WHILE LYING DOWN TO REST IN THE AFTERNOON WHEN CIRCUMSTANCES PERMIT: 1
HOW LIKELY ARE YOU TO NOD OFF OR FALL ASLEEP WHILE SITTING INACTIVE IN A PUBLIC PLACE: 0
HOW LIKELY ARE YOU TO NOD OFF OR FALL ASLEEP WHILE LYING DOWN TO REST IN THE AFTERNOON WHEN CIRCUMSTANCES PERMIT: 1
ESS TOTAL SCORE: 5
HOW LIKELY ARE YOU TO NOD OFF OR FALL ASLEEP IN A CAR, WHILE STOPPED FOR A FEW MINUTES IN TRAFFIC: 1
HOW LIKELY ARE YOU TO NOD OFF OR FALL ASLEEP WHEN YOU ARE A PASSENGER IN A CAR FOR AN HOUR WITHOUT A BREAK: 1
HOW LIKELY ARE YOU TO NOD OFF OR FALL ASLEEP WHILE SITTING AND TALKING TO SOMEONE: 0
HOW LIKELY ARE YOU TO NOD OFF OR FALL ASLEEP WHILE SITTING AND TALKING TO SOMEONE: 0
HOW LIKELY ARE YOU TO NOD OFF OR FALL ASLEEP WHILE SITTING QUIETLY AFTER LUNCH WITHOUT ALCOHOL: 1
HOW LIKELY ARE YOU TO NOD OFF OR FALL ASLEEP WHILE SITTING INACTIVE IN A PUBLIC PLACE: 0
HOW LIKELY ARE YOU TO NOD OFF OR FALL ASLEEP IN A CAR, WHILE STOPPED FOR A FEW MINUTES IN TRAFFIC: 0

## 2022-12-16 NOTE — PROGRESS NOTES
Patient ID: Lanette Fritz is a 50 y.o. female who is being seen today for   Chief Complaint   Patient presents with    Follow-up     31-90     Referring: LUIS Fournier    HPI:   Lanette Fritz is a 50 y.o. female for televideo appointment via video and audio mychart virtual visit for LINDSAY follow up. HST and CPAP titration were reviewed by me and noted below. HST showed mild LINDSAY and patient started CPAP after titration. Results were dicussed with patient and multiple good questions were answered. States she is doing well with CPAP. States sleeping better less tired in the day since using CPAP. States no longer falling asleep at work. PLMS on sleep study no RLS. Patient is using CPAP   9 hrs/night. Using humidifier. No snoring on CPAP. The pressure is well tolerated. The mask is comfortable-full face. No mask leak. No significant daytime sleepiness. Denies nodding off when driving. No dry nose or throat. No fatigue. Bedtime is 11 pm and rise time is 630-7 am. Sleep onset is few minutes. Wakes up 2 times at night total- to reposition. No nocturia. It takes few minutes to fall back a sleep. Occasional naps during the day. No headache in am. No weight gain. 1 caffienated beverages during the day. No alcohol. ESS is 4. Initial HPI 6/10/22  Lanette Fritz is a 50 y.o. female for televideo appointment via video and audio doxy. me virtual visit for sleeping difficulties evaluation. Patient reports snoring at night for the past 5 years. Unknown if worse in supine position- states usually side sleeper. Wakes self snoring. Has witnessed apnea. No restorative sleep. +dry mouth upon awakening. Fatigue and tiredness during the day. No history of sleep apnea. Bedtime 1030 pm and rise time is 7-710 am. It takes 45 minutes to fall asleep- lays there. 1-2 nocturia. Wakes up 3-4 times at night. It takes usually few minutes to fall back a sleep.  Takes occasional nap during the day- naps when she is able to ( 90 minutes). No headache in am. No car wrecks or near wrecks because of the sleepiness. No nodding off while driving, can get sleepy at times. No weight gain. +forgetfulness and  decreased concentration. Drinks 1 caffinated beverages per day. No significant alcohol. No restless feelings in legs at night. No loss of muscle strength when angry or laugh. No hallucination when dozing off or waking up from sleep. No paralysis upon awakening from sleep or going to sleep. No teeth grinding. No nightmares. No sleep walking. No night time panic attacks. No narcotics. No drug abuse. +history of depression feels pretty well controlled. No history of anxiety. No history of atrial fibrillation. No history of DM. +history of HTN. +history of ischemic heart disease. No history of stroke. ESS is 15. No smoking. No known FH for LINDSAY, RLS or narcolepsy.      Sleep Medicine 2022 2022 6/10/2022   Sitting and reading 1 1 2   Watching TV 0 0 2   Sitting, inactive in a public place (e.g. a theatre or a meeting) 0 0 2   As a passenger in a car for an hour without a break 1 1 2   Lying down to rest in the afternoon when circumstances permit 1 1 3   Sitting and talking to someone 0 0 0   Sitting quietly after a lunch without alcohol 1 1 3   In a car, while stopped for a few minutes in traffic 0 1 1   [place] Sleepiness Score 4 5 15       Past Medical History:  Past Medical History:   Diagnosis Date    CAD (coronary artery disease)     Depression     Hyperlipidemia     Hypertension        Past Surgical History:        Procedure Laterality Date     SECTION      CORONARY ANGIOPLASTY WITH STENT PLACEMENT  2018    CYSTOSCOPY N/A 2020    URETHRAL DILATATION    CYSTOSCOPY N/A 2020    CYSTOSCOPY, URETHRAL DILATATION performed by Shaquille Shrestha MD at [facility] CATH LAB PROCEDURE      DILATION AND CURETTAGE OF UTERUS      HYSTERECTOMY (CERVIX STATUS UNKNOWN)      OVARY REMOVAL PTCA         Allergies:  is allergic to lisinopril, morphine and related, and sulfa antibiotics. Social History:    TOBACCO:   reports that she quit smoking about 4 years ago. Her smoking use included cigarettes. She smoked an average of .25 packs per day. She has never used smokeless tobacco.  ETOH:   reports current alcohol use of about 1.0 standard drink per week. Family History:       Problem Relation Age of Onset    Heart Surgery Father     Breast Cancer Maternal Aunt        Current Medications:    Current Outpatient Medications:     carvedilol (COREG) 3.125 MG tablet, TAKE 1 TABLET TWICE A DAY, Disp: 180 tablet, Rfl: 1    rosuvastatin (CRESTOR) 40 MG tablet, Take one tablet daily, Disp: 90 tablet, Rfl: 1    omeprazole (PRILOSEC) 20 MG delayed release capsule, Take 20 mg by mouth daily, Disp: , Rfl:     FLUoxetine (PROZAC) 20 MG capsule, Take 40 mg by mouth daily, Disp: , Rfl:     Levocetirizine Dihydrochloride (XYZAL PO), Take by mouth, Disp: , Rfl:     Fluticasone Propionate (FLONASE NA), by Nasal route, Disp: , Rfl:     [employer] ASPIRIN ADULT LOW DOSE 81 MG chewable tablet, TAKE 1 TABLET BY MOUTH EVERY DAY, Disp: 90 tablet, Rfl: 3        Review of Systems        Objective:   PHYSICAL EXAM:  There were no vitals taken for this visit. Physical Exam  Exam:  Gen: No acute distress, does not appear to be in pain. Appears well developed and nourished. HENT: Head is normocephalic and atraumatic. Normal appearing nose. External Ears normal.   Neck: No visualized mass. Trachea is midline   Eyes: EOM intact. No visible discharge. Resp:No visualized signs of difficulty breathing or respiratory distress, speaking in full sentences. Respiratory effort normal.  Neuro: Awake. Alert. Able to follow commands. No facial asymmetry. Skin: No significant lesions or discoloration noted on facial skin    Musculoskeletal: Normal range of motion of the neck. Psych: Oriented x 3. No anxiety. Normal affect.          DATA: 7/14/2018 echo EF 65%  Cardiology notes reviewed  7/15/2022 HST AHI 7.5, low SPO2 85%  9/11/2022 controlled LINDSAY with CPAP, recommendation CPAP 8 cm H2O, PLMS 20.8    CPAP download data:  Compliance download report from 11/14/22 to 12/13/22 reviewed today by me and showed patient is using machine 8:26 hrs/night with 100% compliance and AHI 0.9 within this time frame. 30/30days with greater than 4 hours of machine use. CPAP 8 cm H20     Assessment:       Mild LINDSAY. CPAP 8 cm H2O. Optimal compliance and efficacy on review today  Snoring-resolved on CPAP  Observed sleep apnea -resolved on CPAP  Hypersomnia -improving  Fatigue-improving  CAD, HTN-followed by cardiology        Plan:      Continue CPAP 8 cm H2O  Advised to use CPAP 6-8 hrs at night and during naps. Replacement of mask, tubing, head straps every 3-6 months or sooner if damaged. Patient instructed to contact DME company for any mask, tubing or machine trouble shooting if problems arise. Sleep hygiene  Avoidsedatives, alcohol and caffeinated drinks at bed time. No driving motorized vehicles or operating heavy machinery while fatigue, drowsy or sleepy-patient verbalized understanding and agrees. Weight loss is also recommended as a long-term intervention. Complications of LINDSAY if not treated were discussed with patient patient to include systemic hypertension, pulmonary hypertension, cardiovascular morbidities, car accidents and all cause mortality. Discussed pathophysiology of LINDSAY with patient today  Patient education handout provided regarding sleep tips   Continue blood pressure medications as ordered by treating providers- treatment of LINDSAY can lower blood pressure by levels that are clinically significant. Felecia Gonzales, was evaluated through a synchronous (real-time) audio-video encounter. The patient (or guardian if applicable) is aware that this is a billable service, which includes applicable co-pays.  This Virtual Visit was

## 2022-12-16 NOTE — PATIENT INSTRUCTIONS
Absolutely no driving if sleepy.   It is your responsibility not to drive if fatigued, tired, or sleepy

## 2023-01-27 RX ORDER — CARVEDILOL 3.12 MG/1
TABLET ORAL
Qty: 180 TABLET | Refills: 2 | Status: SHIPPED | OUTPATIENT
Start: 2023-01-27

## 2023-04-01 SDOH — HEALTH STABILITY: PHYSICAL HEALTH: ON AVERAGE, HOW MANY MINUTES DO YOU ENGAGE IN EXERCISE AT THIS LEVEL?: 30 MIN

## 2023-04-01 SDOH — HEALTH STABILITY: PHYSICAL HEALTH: ON AVERAGE, HOW MANY DAYS PER WEEK DO YOU ENGAGE IN MODERATE TO STRENUOUS EXERCISE (LIKE A BRISK WALK)?: 5 DAYS

## 2023-04-03 ENCOUNTER — OFFICE VISIT (OUTPATIENT)
Dept: ORTHOPEDIC SURGERY | Age: 48
End: 2023-04-03

## 2023-04-03 VITALS — HEIGHT: 64 IN | WEIGHT: 172 LBS | BODY MASS INDEX: 29.37 KG/M2

## 2023-04-03 DIAGNOSIS — D69.6 THROMBOCYTOPENIA (HCC): ICD-10-CM

## 2023-04-03 DIAGNOSIS — G56.03 BILATERAL CARPAL TUNNEL SYNDROME: Primary | ICD-10-CM

## 2023-04-03 NOTE — PROGRESS NOTES
-unremarkable      Hand Exam:      Neurologic Exam:    Reflexes:  Normal  Phalens:  Positive  Tinels:  Positive  Median Nerve Compression:  Positive  Thenar strength: Diminished bilateral  Thenar atrophy: None noted  2 PD: Diminished in the median nerve distribution  Elbow Flexion Test:  Negative    Wrist Motion Right Left   DF     PF     RD     CMC     UD     SUP     PRO       NCV / EMG STUDIES    To be scheduled    IMAGING STUDIES    Were not taken    IMPRESSION    Bilateral carpal tunnel syndrome    PLAN    1. Conservative care options including physical therapy, NSAIDs, bracing, and activity modification were discussed. 2.  The indications for therapeutic injections were discussed. 3.  The indications for additional imaging studies were discussed. 4.  After considering the various options discussed, the patient elected to pursue a course that includes EMGs and I had a discussion with her about carpal tunnel release surgery as well as its perioperative management and potential complications. She appears to understand and desires to proceed. When she finds out when the EMG will be performed, she will call to let us know and we will tentatively put her on the OR schedule for left followed 2 weeks later by right carpal tunnel release assuming that she has carpal tunnel syndrome. If the EMG shows is something different than that or potentially a double crush syndrome that I have discussed with her, then her disposition may change but otherwise will proceed accordingly. INFORMED CONSENT NOTE        We discussed the risks, benefits, and alternatives to the proposed procedure, as well as the necessity of other members of the healthcare team participating in the procedure. All questions were answered and the patient elected to proceed with the proposed procedure and signed the informed consent form.

## 2023-04-17 ENCOUNTER — TELEPHONE (OUTPATIENT)
Dept: ORTHOPEDIC SURGERY | Age: 48
End: 2023-04-17

## 2023-04-17 NOTE — TELEPHONE ENCOUNTER
Auth: ROGER  Date: 05/08/23  Reference # [id number]   Spoke with: Online  Type of SX: Outpatient  Location: [facility]  CPT: 78500   DX: G56.01  SX area: Lt hand/wrist  Insurance: Jeanette Chatman

## 2023-04-17 NOTE — TELEPHONE ENCOUNTER
Auth: NPR  Date: 05/26/23  Reference # [id number]  Spoke with: Online  Type of SX: Outpatient  [place] [facility]  CPT: 91569   DX: G56.01  SX area: Rt hand/wrist  Insurance: Kyree Taylor

## 2023-04-28 ENCOUNTER — TELEPHONE (OUTPATIENT)
Dept: ORTHOPEDIC SURGERY | Age: 48
End: 2023-04-28

## 2023-05-01 ENCOUNTER — HOSPITAL ENCOUNTER (OUTPATIENT)
Age: 48
Discharge: HOME | End: 2023-05-01
Payer: COMMERCIAL

## 2023-05-01 DIAGNOSIS — D69.6 THROMBOCYTOPENIA (HCC): ICD-10-CM

## 2023-05-01 LAB
BASOPHILS # BLD: 0 K/UL (ref 0–0.2)
BASOPHILS NFR BLD: 0.3 %
DEPRECATED RDW RBC AUTO: 12.9 % (ref 12.4–15.4)
EOSINOPHIL # BLD: 0.1 K/UL (ref 0–0.6)
EOSINOPHIL NFR BLD: 1.8 %
HCT VFR BLD AUTO: 44.1 % (ref 36–48)
HGB BLD-MCNC: 14.9 G/DL (ref 12–16)
LYMPHOCYTES # BLD: 1.7 K/UL (ref 1–5.1)
LYMPHOCYTES NFR BLD: 36.5 %
MCH RBC QN AUTO: 30.9 PG (ref 26–34)
MCHC RBC AUTO-ENTMCNC: 33.9 G/DL (ref 31–36)
MCV RBC AUTO: 91.2 FL (ref 80–100)
MONOCYTES # BLD: 0.5 K/UL (ref 0–1.3)
MONOCYTES NFR BLD: 10.6 %
NEUTROPHILS # BLD: 2.3 K/UL (ref 1.7–7.7)
NEUTROPHILS NFR BLD: 50.8 %
PLATELET # BLD AUTO: 254 K/UL (ref 135–450)
PMV BLD AUTO: 9.1 FL (ref 5–10.5)
RBC # BLD AUTO: 4.84 M/UL (ref 4–5.2)
WBC # BLD AUTO: 4.6 K/UL (ref 4–11)

## 2023-05-01 PROCEDURE — 36415 COLL VENOUS BLD VENIPUNCTURE: CPT

## 2023-05-01 PROCEDURE — 85025 COMPLETE CBC W/AUTO DIFF WBC: CPT

## 2023-05-02 ENCOUNTER — TELEPHONE (OUTPATIENT)
Dept: CARDIOLOGY CLINIC | Age: 48
End: 2023-05-02

## 2023-05-02 ENCOUNTER — ANESTHESIA EVENT (OUTPATIENT)
Dept: OPERATING ROOM | Age: 48
End: 2023-05-02
Payer: COMMERCIAL

## 2023-05-02 NOTE — TELEPHONE ENCOUNTER
The patient's cardiac condition is not prohibitory to undergo surgery. The patient's cardiac risk is moderate based upon my evaluation and testing. Stable to proceed.      Hold asa 4 days if necessary

## 2023-05-02 NOTE — TELEPHONE ENCOUNTER
CARDIAC CLEARANCE REQUEST    What type of procedure are you having:  Left carpal tunnel release  Are you taking any blood thinners:  N/a  Type on anesthesia:    When is your procedure scheduled for:  05/08/2023  What physician is performing your procedure:  Dr. Jose Win  Phone Number:  [phone]  Fax number to send the letter:         Pts pcp saw pt in office on 5/2 for preop evaluation and asked that pt get cardiac clearance. Please advise.

## 2023-05-08 ENCOUNTER — HOSPITAL ENCOUNTER (OUTPATIENT)
Age: 48
Setting detail: OUTPATIENT SURGERY
Discharge: HOME | End: 2023-05-08
Attending: ORTHOPAEDIC SURGERY | Admitting: ORTHOPAEDIC SURGERY
Payer: COMMERCIAL

## 2023-05-08 ENCOUNTER — ANESTHESIA (OUTPATIENT)
Dept: OPERATING ROOM | Age: 48
End: 2023-05-08
Payer: COMMERCIAL

## 2023-05-08 VITALS
BODY MASS INDEX: 30.39 KG/M2 | WEIGHT: 178 LBS | RESPIRATION RATE: 10 BRPM | OXYGEN SATURATION: 94 % | SYSTOLIC BLOOD PRESSURE: 108 MMHG | DIASTOLIC BLOOD PRESSURE: 70 MMHG | HEART RATE: 65 BPM | HEIGHT: 64 IN | TEMPERATURE: 98 F

## 2023-05-08 DIAGNOSIS — G56.02 LEFT CARPAL TUNNEL SYNDROME: Primary | ICD-10-CM

## 2023-05-08 PROCEDURE — 3600000002 HC SURGERY LEVEL 2 BASE: Performed by: ORTHOPAEDIC SURGERY

## 2023-05-08 PROCEDURE — 64721 CARPAL TUNNEL SURGERY: CPT | Performed by: ORTHOPAEDIC SURGERY

## 2023-05-08 PROCEDURE — 7100000010 HC PHASE II RECOVERY - FIRST 15 MIN: Performed by: ORTHOPAEDIC SURGERY

## 2023-05-08 PROCEDURE — 6360000002 HC RX W HCPCS: Performed by: NURSE ANESTHETIST, CERTIFIED REGISTERED

## 2023-05-08 PROCEDURE — 2500000003 HC RX 250 WO HCPCS: Performed by: NURSE ANESTHETIST, CERTIFIED REGISTERED

## 2023-05-08 PROCEDURE — 3700000001 HC ADD 15 MINUTES (ANESTHESIA): Performed by: ORTHOPAEDIC SURGERY

## 2023-05-08 PROCEDURE — 2500000003 HC RX 250 WO HCPCS: Performed by: ANESTHESIOLOGY

## 2023-05-08 PROCEDURE — 3700000000 HC ANESTHESIA ATTENDED CARE: Performed by: ORTHOPAEDIC SURGERY

## 2023-05-08 PROCEDURE — 7100000011 HC PHASE II RECOVERY - ADDTL 15 MIN: Performed by: ORTHOPAEDIC SURGERY

## 2023-05-08 PROCEDURE — 2580000003 HC RX 258: Performed by: NURSE ANESTHETIST, CERTIFIED REGISTERED

## 2023-05-08 PROCEDURE — 2500000003 HC RX 250 WO HCPCS: Performed by: ORTHOPAEDIC SURGERY

## 2023-05-08 PROCEDURE — 6360000002 HC RX W HCPCS: Performed by: ORTHOPAEDIC SURGERY

## 2023-05-08 PROCEDURE — 2580000003 HC RX 258: Performed by: ANESTHESIOLOGY

## 2023-05-08 PROCEDURE — 2580000003 HC RX 258: Performed by: ORTHOPAEDIC SURGERY

## 2023-05-08 PROCEDURE — 2709999900 HC NON-CHARGEABLE SUPPLY: Performed by: ORTHOPAEDIC SURGERY

## 2023-05-08 PROCEDURE — 3600000012 HC SURGERY LEVEL 2 ADDTL 15MIN: Performed by: ORTHOPAEDIC SURGERY

## 2023-05-08 RX ORDER — DEXMEDETOMIDINE HYDROCHLORIDE 100 UG/ML
INJECTION, SOLUTION INTRAVENOUS PRN
Status: DISCONTINUED | OUTPATIENT
Start: 2023-05-08 | End: 2023-05-08 | Stop reason: SDUPTHER

## 2023-05-08 RX ORDER — PROPOFOL 10 MG/ML
INJECTION, EMULSION INTRAVENOUS PRN
Status: DISCONTINUED | OUTPATIENT
Start: 2023-05-08 | End: 2023-05-08 | Stop reason: SDUPTHER

## 2023-05-08 RX ORDER — LIDOCAINE HYDROCHLORIDE 20 MG/ML
INJECTION, SOLUTION EPIDURAL; INFILTRATION; INTRACAUDAL; PERINEURAL PRN
Status: DISCONTINUED | OUTPATIENT
Start: 2023-05-08 | End: 2023-05-08 | Stop reason: SDUPTHER

## 2023-05-08 RX ORDER — SODIUM CHLORIDE 9 MG/ML
25 INJECTION, SOLUTION INTRAVENOUS PRN
Status: DISCONTINUED | OUTPATIENT
Start: 2023-05-08 | End: 2023-05-08 | Stop reason: HOSPADM

## 2023-05-08 RX ORDER — SODIUM CHLORIDE 0.9 % (FLUSH) 0.9 %
5-40 SYRINGE (ML) INJECTION PRN
Status: DISCONTINUED | OUTPATIENT
Start: 2023-05-08 | End: 2023-05-08 | Stop reason: HOSPADM

## 2023-05-08 RX ORDER — SODIUM CHLORIDE 0.9 % (FLUSH) 0.9 %
5-40 SYRINGE (ML) INJECTION EVERY 12 HOURS SCHEDULED
Status: DISCONTINUED | OUTPATIENT
Start: 2023-05-08 | End: 2023-05-08 | Stop reason: HOSPADM

## 2023-05-08 RX ORDER — MEPERIDINE HYDROCHLORIDE 25 MG/ML
12.5 INJECTION INTRAMUSCULAR; INTRAVENOUS; SUBCUTANEOUS EVERY 5 MIN PRN
Status: DISCONTINUED | OUTPATIENT
Start: 2023-05-08 | End: 2023-05-08 | Stop reason: HOSPADM

## 2023-05-08 RX ORDER — LIDOCAINE HYDROCHLORIDE 10 MG/ML
INJECTION, SOLUTION INFILTRATION; PERINEURAL PRN
Status: DISCONTINUED | OUTPATIENT
Start: 2023-05-08 | End: 2023-05-08 | Stop reason: ALTCHOICE

## 2023-05-08 RX ORDER — FAMOTIDINE 10 MG/ML
20 INJECTION, SOLUTION INTRAVENOUS ONCE
Status: COMPLETED | OUTPATIENT
Start: 2023-05-08 | End: 2023-05-08

## 2023-05-08 RX ORDER — SODIUM CHLORIDE, SODIUM LACTATE, POTASSIUM CHLORIDE, CALCIUM CHLORIDE 600; 310; 30; 20 MG/100ML; MG/100ML; MG/100ML; MG/100ML
INJECTION, SOLUTION INTRAVENOUS CONTINUOUS
Status: DISCONTINUED | OUTPATIENT
Start: 2023-05-08 | End: 2023-05-08 | Stop reason: HOSPADM

## 2023-05-08 RX ORDER — ONDANSETRON 2 MG/ML
4 INJECTION INTRAMUSCULAR; INTRAVENOUS
Status: DISCONTINUED | OUTPATIENT
Start: 2023-05-08 | End: 2023-05-08 | Stop reason: HOSPADM

## 2023-05-08 RX ORDER — FENTANYL CITRATE 50 UG/ML
INJECTION, SOLUTION INTRAMUSCULAR; INTRAVENOUS PRN
Status: DISCONTINUED | OUTPATIENT
Start: 2023-05-08 | End: 2023-05-08 | Stop reason: SDUPTHER

## 2023-05-08 RX ORDER — OXYCODONE HYDROCHLORIDE 5 MG/1
10 TABLET ORAL PRN
Status: DISCONTINUED | OUTPATIENT
Start: 2023-05-08 | End: 2023-05-08 | Stop reason: HOSPADM

## 2023-05-08 RX ORDER — OXYCODONE HYDROCHLORIDE 5 MG/1
5 TABLET ORAL PRN
Status: DISCONTINUED | OUTPATIENT
Start: 2023-05-08 | End: 2023-05-08 | Stop reason: HOSPADM

## 2023-05-08 RX ORDER — SODIUM CHLORIDE 9 MG/ML
INJECTION, SOLUTION INTRAVENOUS PRN
Status: DISCONTINUED | OUTPATIENT
Start: 2023-05-08 | End: 2023-05-08 | Stop reason: HOSPADM

## 2023-05-08 RX ORDER — BUPIVACAINE HYDROCHLORIDE 2.5 MG/ML
INJECTION, SOLUTION INFILTRATION; PERINEURAL PRN
Status: DISCONTINUED | OUTPATIENT
Start: 2023-05-08 | End: 2023-05-08 | Stop reason: ALTCHOICE

## 2023-05-08 RX ORDER — OXYCODONE HYDROCHLORIDE AND ACETAMINOPHEN 5; 325 MG/1; MG/1
1 TABLET ORAL EVERY 6 HOURS PRN
Qty: 28 TABLET | Refills: 0 | Status: SHIPPED | OUTPATIENT
Start: 2023-05-08 | End: 2023-05-15

## 2023-05-08 RX ORDER — SODIUM CHLORIDE, SODIUM LACTATE, POTASSIUM CHLORIDE, CALCIUM CHLORIDE 600; 310; 30; 20 MG/100ML; MG/100ML; MG/100ML; MG/100ML
INJECTION, SOLUTION INTRAVENOUS CONTINUOUS PRN
Status: DISCONTINUED | OUTPATIENT
Start: 2023-05-08 | End: 2023-05-08 | Stop reason: SDUPTHER

## 2023-05-08 RX ADMIN — SODIUM CHLORIDE, POTASSIUM CHLORIDE, SODIUM LACTATE AND CALCIUM CHLORIDE: 600; 310; 30; 20 INJECTION, SOLUTION INTRAVENOUS at 09:22

## 2023-05-08 RX ADMIN — FENTANYL CITRATE 50 MCG: 50 INJECTION INTRAMUSCULAR; INTRAVENOUS at 10:22

## 2023-05-08 RX ADMIN — FAMOTIDINE 20 MG: 10 INJECTION, SOLUTION INTRAVENOUS at 09:23

## 2023-05-08 RX ADMIN — FENTANYL CITRATE 25 MCG: 50 INJECTION INTRAMUSCULAR; INTRAVENOUS at 10:37

## 2023-05-08 RX ADMIN — PROPOFOL 200 MG: 10 INJECTION, EMULSION INTRAVENOUS at 10:30

## 2023-05-08 RX ADMIN — LIDOCAINE HYDROCHLORIDE 60 MG: 20 INJECTION, SOLUTION EPIDURAL; INFILTRATION; INTRACAUDAL; PERINEURAL at 10:30

## 2023-05-08 RX ADMIN — FENTANYL CITRATE 25 MCG: 50 INJECTION INTRAMUSCULAR; INTRAVENOUS at 10:31

## 2023-05-08 RX ADMIN — SODIUM CHLORIDE, POTASSIUM CHLORIDE, SODIUM LACTATE AND CALCIUM CHLORIDE: 600; 310; 30; 20 INJECTION, SOLUTION INTRAVENOUS at 10:21

## 2023-05-08 RX ADMIN — CEFAZOLIN 2000 MG: 2 INJECTION, POWDER, FOR SOLUTION INTRAMUSCULAR; INTRAVENOUS at 10:26

## 2023-05-08 RX ADMIN — DEXMEDETOMIDINE HYDROCHLORIDE 10 MCG: 100 INJECTION, SOLUTION INTRAVENOUS at 10:22

## 2023-05-08 ASSESSMENT — PAIN - FUNCTIONAL ASSESSMENT: PAIN_FUNCTIONAL_ASSESSMENT: NONE - DENIES PAIN

## 2023-05-08 ASSESSMENT — ENCOUNTER SYMPTOMS: SHORTNESS OF BREATH: 1

## 2023-05-08 ASSESSMENT — PAIN DESCRIPTION - FREQUENCY: FREQUENCY: OTHER (COMMENT)

## 2023-05-08 ASSESSMENT — PAIN SCALES - GENERAL
PAINLEVEL_OUTOF10: 0
PAINLEVEL_OUTOF10: 0

## 2023-05-08 ASSESSMENT — PAIN DESCRIPTION - DESCRIPTORS: DESCRIPTORS: OTHER (COMMENT)

## 2023-05-08 ASSESSMENT — LIFESTYLE VARIABLES: SMOKING_STATUS: 0

## 2023-05-08 ASSESSMENT — PAIN DESCRIPTION - ORIENTATION: ORIENTATION: LEFT

## 2023-05-08 ASSESSMENT — PAIN DESCRIPTION - LOCATION: LOCATION: WRIST

## 2023-05-08 ASSESSMENT — PAIN DESCRIPTION - PAIN TYPE: TYPE: OTHER (COMMENT)

## 2023-05-08 NOTE — BRIEF OP NOTE
Brief Postoperative Note      Patient: Sariah Mccall  YOB: 1975  MRN: [id number]    Date of Procedure: 5/8/2023    Pre-Op Diagnosis Codes:     * Left carpal tunnel syndrome [G56.02]    Post-Op Diagnosis: Same       Procedure(s):  LEFT CARPAL TUNNEL RELEASE    Surgeon(s):  Michelle Maza MD    Assistant:  Surgical Assistant: Chi Henson    Anesthesia: Monitor Anesthesia Care    Estimated Blood Loss (mL): Minimal    Complications: None    Specimens:   * No specimens in log *    Implants:  * No implants in log *      Drains: * No LDAs found *    Findings: manda      Electronically signed by Michelle Maza MD on 5/8/2023 at 10:42 AM

## 2023-05-08 NOTE — ANESTHESIA POSTPROCEDURE EVALUATION
Department of Anesthesiology  Postprocedure Note    Patient: Valentine García  MRN: [id number]  YOB: 1975  Date of evaluation: 5/8/2023      Procedure Summary     Date: 05/08/23 Room / Location: [address] [place] [address] / [facility]    Anesthesia Start: 6183 Anesthesia Stop: 8716    Procedure: LEFT CARPAL TUNNEL RELEASE (Left: Hand) Diagnosis:       Left carpal tunnel syndrome      (Left carpal tunnel syndrome [G56.02])    Surgeons: Toyin Armstrong MD Responsible Provider: Sadie Hathaway MD    Anesthesia Type: general ASA Status: 2          Anesthesia Type: No value filed.     Nereida Phase I: Nereida Score: 10    Nereida Phase II: Nereida Score: 10    Vitals:    05/03/23 1245 05/08/23 0902 05/08/23 1055 05/08/23 1058   BP:  (!) 142/76  99/64   Pulse:  77 64 67   Resp:  16  12   Temp:  98.7 °F (37.1 °C)  97.2 °F (36.2 °C)   TempSrc:  Infrared  Infrared   SpO2:  97%  94%   Weight: 176 lb (79.8 kg) 178 lb (80.7 kg)     Height: 5' 4\" (1.626 m) 5' 4\" (1.626 m)       Anesthesia Post Evaluation    Patient location during evaluation: bedside  Patient participation: complete - patient participated  Level of consciousness: awake and alert  Airway patency: patent  Nausea & Vomiting: no nausea  Complications: no  Cardiovascular status: hemodynamically stable  Respiratory status: acceptable  Hydration status: euvolemic

## 2023-05-08 NOTE — H&P
Update History & Physical    The patient's History and Physical was reviewed with the patient and I examined the patient. There was no change. The surgical site was confirmed by the patient and me. Plan: The risks, benefits, expected outcome, and alternative to the recommended procedure have been discussed with the patient. Patient understands and wants to proceed with the procedure.      Electronically signed by Alice Tyler MD on 5/8/2023 at 9:08 AM

## 2023-05-08 NOTE — DISCHARGE INSTRUCTIONS
Ice to wrist/hand 8 hours. Elevate for 8 hours. Sling for 8 hours and / or when up walking. May loosen elastic bandage if fingers swell. Keep dressing dry. May remove bulky dressing in 8 days. .     ANESTHESIA DISCHARGE INSTRUCTIONS    You are under the influence of drugs- do not drink alcohol, drive a car, operate machinery(such as power tools, kitchen appliances, etc), sign legal documents, or make any important decisions for 24 hours (or while on pain medications). Children should not ride bikes or [place] or play on gym sets  for 24 hours after surgery. A responsible adult should be with you for 24 hours. Rest at home today- increase activity as tolerated. Progress slowly to a regular diet unless your physician has instructed you otherwise. Drink plenty of water. CALL YOUR DOCTOR IF YOU:  Have moderate to severe nausea or vomiting AND are unable to hold down fluids or prescribed medications. Have bright red bloody drainage from your dressing that won't stop oozing. Do not get relief with your pain medication    NORMAL (POSSIBLE) SIDE EFFECTS FROM ANESTHESIA:     Confusion, temporary memory loss, delayed reaction times in the first 24 hours  Lightheadedness, dizziness, difficulty focusing, blurred vision  Nausea/vomiting can happen  Shivering, feeling cold, sore throat, cough and muscle aches should stop within 24-48 hours  Trouble urinating - call your surgeon if it has been more than 8 hrs  Bruising or soreness at the IV site - call if it remains red, firm or there is drainage             FEMALES OF CHILDBEARING AGE WHO ARE TAKING BIRTH CONTROL PILLS:  You may have received a medication during your procedure that interferes with the   actions of birth control pills (Bridion or Emend). Use some other kind of birth control in addition to your pills, like a condom, for 1 month after your procedure to prevent unwanted pregnancy.     The following instructions are to be followed if you have a

## 2023-05-08 NOTE — ANESTHESIA PRE PROCEDURE
Department of Anesthesiology  Preprocedure Note       Name:  Ashley Catherine   Age:  46 y.o.  :  1975                                          MRN:  [id number]         Date:  2023      Surgeon: Zo Beckett):  Josh Fitzpatrick MD    Procedure: Procedure(s):  LEFT CARPAL TUNNEL RELEASE    Medications prior to admission:   Prior to Admission medications    Medication Sig Start Date End Date Taking? Authorizing Provider   carvedilol (COREG) 3.125 MG tablet TAKE 1 TABLET TWICE A DAY 23   Antonio Higgins MD   rosuvastatin (CRESTOR) 40 MG tablet Take one tablet daily 22   Antonio Higgins MD   omeprazole (PRILOSEC) 20 MG delayed release capsule Take 1 capsule by mouth daily 6/22/15   Historical Provider, MD   FLUoxetine (PROZAC) 20 MG capsule Take 2 capsules by mouth daily    Historical Provider, MD   Levocetirizine Dihydrochloride (XYZAL PO) Take by mouth daily    Historical Provider, MD   Fluticasone Propionate (FLONASE NA) by Nasal route daily    Historical Provider, MD   [employer] ASPIRIN ADULT LOW DOSE 81 MG chewable tablet TAKE 1 TABLET BY MOUTH EVERY DAY 18   Antonio Higgins MD       Current medications:    Current Facility-Administered Medications   Medication Dose Route Frequency Provider Last Rate Last Admin    lactated ringers IV soln infusion   IntraVENous Continuous Richi Kimball  mL/hr at 23 0922 New Bag at 23 0922    sodium chloride flush 0.9 % injection 5-40 mL  5-40 mL IntraVENous 2 times per day Richi Kimball MD        sodium chloride flush 0.9 % injection 5-40 mL  5-40 mL IntraVENous PRN Richi Kimball MD        0.9 % sodium chloride infusion   IntraVENous PRN Richi Kimball MD        ceFAZolin (ANCEF) 2,000 mg in sodium chloride 0.9 % 50 mL IVPB (mini-bag)  2,000 mg IntraVENous Once Josh Fitzpatrick MD           Allergies:     Allergies   Allergen Reactions    Lisinopril      cough    Morphine And Related

## 2023-05-09 NOTE — OP NOTE
[address]                 [address] [address]                                OPERATIVE REPORT    PATIENT NAME: Wily Mathur                  :        1975  MED REC NO:   [id number]                          ROOM:  ACCOUNT NO:   [de-identified]                           ADMIT DATE: 2023  PROVIDER:     Clifton Mcneil MD    DATE OF PROCEDURE:  2023    PREOPERATIVE DIAGNOSIS:  Left carpal tunnel syndrome. POSTOPERATIVE DIAGNOSIS:  Left carpal tunnel syndrome. OPERATION PERFORMED:  Left carpal tunnel release. SURGEON:  Clifton Mcneil MD    ANESTHESIA:  Local MAC. BLOOD LOSS:  Less than 5 mL. COMPLICATIONS:  None noted. INDICATIONS FOR OPERATION:  This 49-year-old female presented with  history, exam, and testing consistent with severe left carpal tunnel  syndrome and after failure of other modalities, elected for the  recommendation of surgical intervention. DESCRIPTION OF OPERATION:  The patient was taken to the operating room,  where under sedation local infiltration into the left palm was performed  using 8 mL of the 50/50 solution of 1% Xylocaine and 0.5% Marcaine  plain. The hand was sterilely prepped and draped, and an Ace wrap was  used for exsanguination and tourniquet control proximal to the wrist.   Under loupe magnification, a 3.5-cm incision was made from the flexion  crease of the wrist distally in line with the 4th ray. Soft tissue was  dissected down through skin and subcutaneous tissue and through the  superficial palmar fascia. The transverse carpal ligament was  identified and released from proximal to distal ends from the median  nerve end of the carpal tunnel through to the trifurcation. The nerve  was minimally compressed. An external neurolysis was performed on the  volar surface to reconstitute the normal nerve diameter.     The wound was repaired with 4-0 nylon in an interrupted

## 2023-05-09 NOTE — BRIEF OP NOTE
Brief Postoperative Note      Patient: Ladan Layne  YOB: 1975  MRN: [id number]    Date of Procedure: 5/8/2023    Pre-Op Diagnosis Codes:     * Left carpal tunnel syndrome [G56.02]    Post-Op Diagnosis: Same       Procedure(s):  LEFT CARPAL TUNNEL RELEASE    Surgeon(s):  Florian Hart MD    Assistant:  Surgical Assistant: Fabrice Bustamante    Anesthesia: Monitor Anesthesia Care    Estimated Blood Loss (mL): Minimal    Complications: None    Specimens:   * No specimens in log *    Implants:  * No implants in log *      Drains: * No LDAs found *    Findings: manda      Electronically signed by Florian Hart MD on 5/9/2023 at 1:57 PM

## 2023-05-15 ENCOUNTER — TELEPHONE (OUTPATIENT)
Dept: ORTHOPEDIC SURGERY | Age: 48
End: 2023-05-15

## 2023-05-16 ENCOUNTER — OFFICE VISIT (OUTPATIENT)
Dept: ORTHOPEDIC SURGERY | Age: 48
End: 2023-05-16

## 2023-05-16 VITALS — WEIGHT: 178 LBS | HEIGHT: 64 IN | BODY MASS INDEX: 30.39 KG/M2

## 2023-05-16 DIAGNOSIS — G56.03 BILATERAL CARPAL TUNNEL SYNDROME: Primary | ICD-10-CM

## 2023-05-16 NOTE — PROGRESS NOTES
FOLLOW-UP VISIT      The patient returns for follow-up s/p left carpal tunnel release    Date of Surgery    5/8/2023    Wound Status    Sutures Removed, Incisions are healing well with no surrounding erythema, and minimal ecchymosis. Exam    She has no signs of infection DVT and the wound is healing nicely and she is happy with the result today. She would like to proceed with her right wrist in 10 days.     Plan    Suture removal and observation    Follow-up Appointment    Follow-up postop management of her left and right wrist.

## 2023-05-26 ENCOUNTER — HOSPITAL ENCOUNTER (OUTPATIENT)
Age: 48
Setting detail: OUTPATIENT SURGERY
Discharge: HOME | End: 2023-05-26
Attending: ORTHOPAEDIC SURGERY | Admitting: ORTHOPAEDIC SURGERY
Payer: COMMERCIAL

## 2023-05-26 ENCOUNTER — ANESTHESIA EVENT (OUTPATIENT)
Dept: OPERATING ROOM | Age: 48
End: 2023-05-26
Payer: COMMERCIAL

## 2023-05-26 ENCOUNTER — ANESTHESIA (OUTPATIENT)
Dept: OPERATING ROOM | Age: 48
End: 2023-05-26
Payer: COMMERCIAL

## 2023-05-26 VITALS
HEART RATE: 72 BPM | WEIGHT: 170 LBS | DIASTOLIC BLOOD PRESSURE: 80 MMHG | OXYGEN SATURATION: 96 % | BODY MASS INDEX: 29.02 KG/M2 | SYSTOLIC BLOOD PRESSURE: 116 MMHG | TEMPERATURE: 98 F | HEIGHT: 64 IN | RESPIRATION RATE: 16 BRPM

## 2023-05-26 DIAGNOSIS — G56.01 CARPAL TUNNEL SYNDROME ON RIGHT: Primary | ICD-10-CM

## 2023-05-26 PROCEDURE — 3600000012 HC SURGERY LEVEL 2 ADDTL 15MIN: Performed by: ORTHOPAEDIC SURGERY

## 2023-05-26 PROCEDURE — 2500000003 HC RX 250 WO HCPCS: Performed by: ORTHOPAEDIC SURGERY

## 2023-05-26 PROCEDURE — 6360000002 HC RX W HCPCS: Performed by: ORTHOPAEDIC SURGERY

## 2023-05-26 PROCEDURE — 7100000011 HC PHASE II RECOVERY - ADDTL 15 MIN: Performed by: ORTHOPAEDIC SURGERY

## 2023-05-26 PROCEDURE — 7100000010 HC PHASE II RECOVERY - FIRST 15 MIN: Performed by: ORTHOPAEDIC SURGERY

## 2023-05-26 PROCEDURE — 3700000000 HC ANESTHESIA ATTENDED CARE: Performed by: ORTHOPAEDIC SURGERY

## 2023-05-26 PROCEDURE — 2580000003 HC RX 258: Performed by: ANESTHESIOLOGY

## 2023-05-26 PROCEDURE — 3600000002 HC SURGERY LEVEL 2 BASE: Performed by: ORTHOPAEDIC SURGERY

## 2023-05-26 PROCEDURE — 2500000003 HC RX 250 WO HCPCS: Performed by: NURSE ANESTHETIST, CERTIFIED REGISTERED

## 2023-05-26 PROCEDURE — 2580000003 HC RX 258: Performed by: ORTHOPAEDIC SURGERY

## 2023-05-26 PROCEDURE — 2709999900 HC NON-CHARGEABLE SUPPLY: Performed by: ORTHOPAEDIC SURGERY

## 2023-05-26 PROCEDURE — 3700000001 HC ADD 15 MINUTES (ANESTHESIA): Performed by: ORTHOPAEDIC SURGERY

## 2023-05-26 PROCEDURE — 6360000002 HC RX W HCPCS: Performed by: NURSE ANESTHETIST, CERTIFIED REGISTERED

## 2023-05-26 RX ORDER — MEPERIDINE HYDROCHLORIDE 25 MG/ML
12.5 INJECTION INTRAMUSCULAR; INTRAVENOUS; SUBCUTANEOUS EVERY 5 MIN PRN
Status: DISCONTINUED | OUTPATIENT
Start: 2023-05-26 | End: 2023-05-26 | Stop reason: HOSPADM

## 2023-05-26 RX ORDER — DIPHENHYDRAMINE HYDROCHLORIDE 50 MG/ML
12.5 INJECTION INTRAMUSCULAR; INTRAVENOUS
Status: DISCONTINUED | OUTPATIENT
Start: 2023-05-26 | End: 2023-05-26 | Stop reason: HOSPADM

## 2023-05-26 RX ORDER — PROPOFOL 10 MG/ML
INJECTION, EMULSION INTRAVENOUS PRN
Status: DISCONTINUED | OUTPATIENT
Start: 2023-05-26 | End: 2023-05-26 | Stop reason: SDUPTHER

## 2023-05-26 RX ORDER — OXYCODONE HYDROCHLORIDE 5 MG/1
10 TABLET ORAL PRN
Status: DISCONTINUED | OUTPATIENT
Start: 2023-05-26 | End: 2023-05-26 | Stop reason: HOSPADM

## 2023-05-26 RX ORDER — OXYCODONE HYDROCHLORIDE AND ACETAMINOPHEN 5; 325 MG/1; MG/1
1 TABLET ORAL EVERY 6 HOURS PRN
Qty: 28 TABLET | Refills: 0 | Status: SHIPPED | OUTPATIENT
Start: 2023-05-26 | End: 2023-06-02

## 2023-05-26 RX ORDER — LIDOCAINE HYDROCHLORIDE 10 MG/ML
INJECTION, SOLUTION INFILTRATION; PERINEURAL
Status: DISCONTINUED
Start: 2023-05-26 | End: 2023-05-26 | Stop reason: HOSPADM

## 2023-05-26 RX ORDER — SODIUM CHLORIDE, SODIUM LACTATE, POTASSIUM CHLORIDE, CALCIUM CHLORIDE 600; 310; 30; 20 MG/100ML; MG/100ML; MG/100ML; MG/100ML
INJECTION, SOLUTION INTRAVENOUS CONTINUOUS
Status: DISCONTINUED | OUTPATIENT
Start: 2023-05-26 | End: 2023-05-26 | Stop reason: HOSPADM

## 2023-05-26 RX ORDER — LABETALOL HYDROCHLORIDE 5 MG/ML
5 INJECTION, SOLUTION INTRAVENOUS EVERY 10 MIN PRN
Status: DISCONTINUED | OUTPATIENT
Start: 2023-05-26 | End: 2023-05-26 | Stop reason: HOSPADM

## 2023-05-26 RX ORDER — LIDOCAINE HYDROCHLORIDE 20 MG/ML
INJECTION, SOLUTION INFILTRATION; PERINEURAL PRN
Status: DISCONTINUED | OUTPATIENT
Start: 2023-05-26 | End: 2023-05-26 | Stop reason: SDUPTHER

## 2023-05-26 RX ORDER — LIDOCAINE HYDROCHLORIDE 10 MG/ML
1 INJECTION, SOLUTION EPIDURAL; INFILTRATION; INTRACAUDAL; PERINEURAL
Status: DISCONTINUED | OUTPATIENT
Start: 2023-05-26 | End: 2023-05-26 | Stop reason: HOSPADM

## 2023-05-26 RX ORDER — ONDANSETRON 2 MG/ML
4 INJECTION INTRAMUSCULAR; INTRAVENOUS
Status: DISCONTINUED | OUTPATIENT
Start: 2023-05-26 | End: 2023-05-26 | Stop reason: HOSPADM

## 2023-05-26 RX ORDER — OXYCODONE HYDROCHLORIDE 5 MG/1
5 TABLET ORAL PRN
Status: DISCONTINUED | OUTPATIENT
Start: 2023-05-26 | End: 2023-05-26 | Stop reason: HOSPADM

## 2023-05-26 RX ORDER — SODIUM CHLORIDE 0.9 % (FLUSH) 0.9 %
5-40 SYRINGE (ML) INJECTION EVERY 12 HOURS SCHEDULED
Status: DISCONTINUED | OUTPATIENT
Start: 2023-05-26 | End: 2023-05-26 | Stop reason: HOSPADM

## 2023-05-26 RX ORDER — SODIUM CHLORIDE 9 MG/ML
INJECTION, SOLUTION INTRAVENOUS PRN
Status: DISCONTINUED | OUTPATIENT
Start: 2023-05-26 | End: 2023-05-26 | Stop reason: HOSPADM

## 2023-05-26 RX ORDER — SODIUM CHLORIDE 0.9 % (FLUSH) 0.9 %
5-40 SYRINGE (ML) INJECTION PRN
Status: DISCONTINUED | OUTPATIENT
Start: 2023-05-26 | End: 2023-05-26 | Stop reason: HOSPADM

## 2023-05-26 RX ORDER — BUPIVACAINE HYDROCHLORIDE 5 MG/ML
INJECTION, SOLUTION EPIDURAL; INTRACAUDAL
Status: DISCONTINUED
Start: 2023-05-26 | End: 2023-05-26 | Stop reason: HOSPADM

## 2023-05-26 RX ADMIN — PROPOFOL 200 MG: 10 INJECTION, EMULSION INTRAVENOUS at 09:17

## 2023-05-26 RX ADMIN — SODIUM CHLORIDE, POTASSIUM CHLORIDE, SODIUM LACTATE AND CALCIUM CHLORIDE: 600; 310; 30; 20 INJECTION, SOLUTION INTRAVENOUS at 07:45

## 2023-05-26 RX ADMIN — LIDOCAINE HYDROCHLORIDE 60 MG: 20 INJECTION, SOLUTION INFILTRATION; PERINEURAL at 09:17

## 2023-05-26 RX ADMIN — CEFAZOLIN 2000 MG: 2 INJECTION, POWDER, FOR SOLUTION INTRAMUSCULAR; INTRAVENOUS at 09:16

## 2023-05-26 ASSESSMENT — PAIN SCALES - GENERAL
PAINLEVEL_OUTOF10: 0

## 2023-05-26 ASSESSMENT — PAIN - FUNCTIONAL ASSESSMENT: PAIN_FUNCTIONAL_ASSESSMENT: 0-10

## 2023-05-26 NOTE — PROGRESS NOTES
Pre procedure complete. IV in place. Patient is able to demonstrate the ability to move from a reclining position to an upright position within the recliner.

## 2023-05-26 NOTE — OP NOTE
[address]                 [address] [address]                                OPERATIVE REPORT    PATIENT NAME: Odell Burrell                  :        1975  MED REC NO:   [id number]                          ROOM:  ACCOUNT NO:   [de-identified]                           ADMIT DATE: 2023  PROVIDER:     Jer Tidwell MD    DATE OF PROCEDURE:  2023    PREOPERATIVE DIAGNOSIS:  Right carpal tunnel syndrome. POSTOPERATIVE DIAGNOSIS:  Right carpal tunnel syndrome. OPERATION PERFORMED:  Right carpal tunnel release. SURGEON:  Jer Tidwell MD    ANESTHESIA:  Local MAC. BLOOD LOSS:  Less than 5 mL. COMPLICATIONS:  None noted. INDICATIONS FOR OPERATION:  This 78-year-old female with a history of  bilateral carpal tunnel syndrome. She was diagnosed with history, exam,  and testing, and after failure of conservative care, she elected for  further recommendation of surgical intervention. She previously  underwent a left carpal tunnel release and elected for same on the  right. DESCRIPTION OF OPERATION:  The patient was taken to the operating room,  where under sedation local infiltration into the right palm was  performed using 8 mL of the 50/50 solution of 1% Xylocaine and 0.5%  Marcaine plain. The hand was sterilely prepped and draped, and an Ace  wrap was used for exsanguination and tourniquet control proximal to the  wrist.  Under loupe magnification, a 3.5-cm incision was made from the  flexion crease of the wrist distally in line with the 4th ray. Soft  tissue was dissected down through skin and subcutaneous tissue and  through the superficial palmar fascia. The transverse carpal ligament  was identified and released from proximal to distal ends from the median  nerve end of the carpal tunnel through to the trifurcation. The nerve  was minimally compressed.   An external neurolysis was performed on the  volar

## 2023-05-26 NOTE — BRIEF OP NOTE
Brief Postoperative Note      Patient: Hannah Taylor  YOB: 1975  MRN: [id number]    Date of Procedure: 5/26/2023    Pre-Op Diagnosis Codes:     * Carpal tunnel syndrome on right [G56.01]    Post-Op Diagnosis: Same       Procedure(s):  RIGHT CARPAL TUNNEL RELEASE    Surgeon(s):  Lupillo Caldera MD    Assistant:  * No surgical staff found *    Anesthesia: Monitor Anesthesia Care    Estimated Blood Loss (mL): Minimal    Complications: None    Specimens:   * No specimens in log *    Implants:  * No implants in log *      Drains: * No LDAs found *    Findings: manda      Electronically signed by Lupillo Caldera MD on 5/26/2023 at 9:33 AM

## 2023-05-26 NOTE — DISCHARGE INSTRUCTIONS
Ice to wrist/hand 8 hours. Elevate for 8 hours. Sling for 8 hours and / or when up walking. May loosen elastic bandage if fingers swell. Keep dressing dry. May remove bulky dressing in 8 days. .     ANESTHESIA DISCHARGE INSTRUCTIONS    You are under the influence of drugs- do not drink alcohol, drive a car, operate machinery(such as power tools, kitchen appliances, etc), sign legal documents, or make any important decisions for 24 hours (or while on pain medications). Children should not ride bikes or [place] or play on gym sets  for 24 hours after surgery. A responsible adult should be with you for 24 hours. Rest at home today- increase activity as tolerated. Progress slowly to a regular diet unless your physician has instructed you otherwise. Drink plenty of water. CALL YOUR DOCTOR IF YOU:  Have moderate to severe nausea or vomiting AND are unable to hold down fluids or prescribed medications. Have bright red bloody drainage from your dressing that won't stop oozing. Do not get relief with your pain medication    NORMAL (POSSIBLE) SIDE EFFECTS FROM ANESTHESIA:     Confusion, temporary memory loss, delayed reaction times in the first 24 hours  Lightheadedness, dizziness, difficulty focusing, blurred vision  Nausea/vomiting can happen  Shivering, feeling cold, sore throat, cough and muscle aches should stop within 24-48 hours  Trouble urinating - call your surgeon if it has been more than 8 hrs  Bruising or soreness at the IV site - call if it remains red, firm or there is drainage             FEMALES OF CHILDBEARING AGE WHO ARE TAKING BIRTH CONTROL PILLS:  You may have received a medication during your procedure that interferes with the   actions of birth control pills (Bridion or Emend). Use some other kind of birth control in addition to your pills, like a condom, for 1 month after your procedure to prevent unwanted pregnancy.     The following instructions are to be followed if you have a

## 2023-05-26 NOTE — ANESTHESIA POSTPROCEDURE EVALUATION
Department of Anesthesiology  Postprocedure Note    Patient: Marlon Diallo  MRN: [id number]  YOB: 1975  Date of evaluation: 5/26/2023      Procedure Summary     Date: 05/26/23 Room / Location: [facility]  OR 03 / [facility]    Anesthesia Start: 0913 Anesthesia Stop: 7422    Procedure: RIGHT CARPAL TUNNEL RELEASE (Right: Hand) Diagnosis:       Carpal tunnel syndrome on right      (Carpal tunnel syndrome on right [G56.01])    Surgeons: Rand Morales MD Responsible Provider: Nichole Lcokett MD    Anesthesia Type: MAC ASA Status: 3          Anesthesia Type: No value filed.     Nereida Phase I: Nereida Score: 10    Nereida Phase II: Nereida Score: 10      Anesthesia Post Evaluation    Comments: Postoperative Anesthesia Note    Name:    Marlon Diallo  MRN:      [id number]    Patient Vitals in the past 12 hrs:  05/26/23 1017, BP:116/80, Pulse:72, Resp:16, SpO2:96 %  05/26/23 1002, BP:117/68, Pulse:74, Resp:16, SpO2:96 %  05/26/23 0947, BP:113/76, Temp:98 °F (36.7 °C), Temp src:Temporal, Pulse:76, Resp:16, SpO2:96 %  05/26/23 0738, BP:116/78, Temp:98 °F (36.7 °C), Pulse:74, Resp:22, SpO2:94 %  05/26/23 0728, Height:5' 4\" (1.626 m), Weight:170 lb (77.1 kg)     LABS:    CBC  Lab Results       Component                Value               Date/Time                  WBC                      4.6                 05/01/2023 11:57 AM        HGB                      14.9                05/01/2023 11:57 AM        HCT                      44.1                05/01/2023 11:57 AM        PLT                      254                 05/01/2023 11:57 AM   RENAL  Lab Results       Component                Value               Date/Time                  NA                       141                 06/24/2022 11:08 AM        K                        3.6                 06/24/2022 11:08 AM        K                        3.7                 09/06/2020 11:59 AM        CL                       104

## 2023-05-26 NOTE — H&P
Update History & Physical    The patient's History and Physical was reviewed with the patient and I examined the patient. There was no change. The surgical site was confirmed by the patient and me. Plan: The risks, benefits, expected outcome, and alternative to the recommended procedure have been discussed with the patient. Patient understands and wants to proceed with the procedure.      Electronically signed by Melecio Monk MD on 5/26/2023 at 7:49 AM

## 2023-05-26 NOTE — ANESTHESIA PRE PROCEDURE
Department of Anesthesiology  Preprocedure Note       Name:  Lourdes Meade   Age:  46 y.o.  :  1975                                          MRN:  [id number]         Date:  2023      Surgeon: Jason Uribe):  Bria Che MD    Procedure: Procedure(s):  RIGHT CARPAL TUNNEL RELEASE    Medications prior to admission:   Prior to Admission medications    Medication Sig Start Date End Date Taking? Authorizing Provider   oxyCODONE-acetaminophen (PERCOCET) 5-325 MG per tablet Take 1 tablet by mouth every 6 hours as needed for Pain for up to 7 days. Intended supply: 7 days.  Take lowest dose possible to manage pain Max Daily Amount: 4 tablets 23 Yes Bria Che MD   carvedilol (COREG) 3.125 MG tablet TAKE 1 TABLET TWICE A DAY 23   Harry Alicia MD   rosuvastatin (CRESTOR) 40 MG tablet Take one tablet daily 22   Harry Alicia MD   omeprazole (PRILOSEC) 20 MG delayed release capsule Take 1 capsule by mouth daily 6/22/15   Historical Provider, MD   FLUoxetine (PROZAC) 20 MG capsule Take 2 capsules by mouth daily    Historical Provider, MD   Levocetirizine Dihydrochloride (XYZAL PO) Take by mouth daily    Historical Provider, MD   Fluticasone Propionate (FLONASE NA) by Nasal route daily    Historical Provider, MD   [employer] ASPIRIN ADULT LOW DOSE 81 MG chewable tablet TAKE 1 TABLET BY MOUTH EVERY DAY 18   Harry Alicia MD       Current medications:    Current Facility-Administered Medications   Medication Dose Route Frequency Provider Last Rate Last Admin    ceFAZolin (ANCEF) 2,000 mg in sodium chloride 0.9 % 50 mL IVPB (mini-bag)  2,000 mg IntraVENous Once Bria Che MD        lidocaine PF 1 % injection 1 mL  1 mL IntraDERmal Once PRN Billy Alfonso MD        lactated ringers IV soln infusion   IntraVENous Continuous Billy Alfonso  mL/hr at 23 0745 New Bag at 23 0745    bupivacaine (PF) (MARCAINE) 0.5 % injection            

## 2023-05-26 NOTE — PROGRESS NOTES
Discharge  instructions reviewed. Pt and family verbalize understanding with no further questions. VSS. Pt discharged via [facility] 23 to car. Assessment unchanged.

## 2023-06-06 ENCOUNTER — OFFICE VISIT (OUTPATIENT)
Dept: ORTHOPEDIC SURGERY | Age: 48
End: 2023-06-06

## 2023-06-06 VITALS — BODY MASS INDEX: 29.02 KG/M2 | HEIGHT: 64 IN | WEIGHT: 170 LBS

## 2023-06-06 DIAGNOSIS — Z98.890 S/P CARPAL TUNNEL RELEASE: ICD-10-CM

## 2023-06-06 DIAGNOSIS — G56.03 BILATERAL CARPAL TUNNEL SYNDROME: Primary | ICD-10-CM

## 2023-06-06 NOTE — PROGRESS NOTES
FOLLOW-UP VISIT      The patient returns for follow-up s/p right carpal tunnel release    Date of Surgery    5/26/2023    Wound Status    Sutures Removed, Incisions are healing well with no surrounding erythema, and minimal ecchymosis. Exam    She doing fairly well although she has more pain with the swelling which is her dominant side than the other side. At this time recommend that she remain off work until least fifth July brace her and have her return here as needed in 4 weeks    Plan    Slow return to normal activity and bracing    Follow-up Appointment    4 weeks as needed        Procedures    April Lambert Gel Wrist Brace     Patient was prescribed a April Lambert Gel Wrist Brace. The right wrist will require stabilization / immobilization from this semi-rigid / rigid orthosis to improve their function. The orthosis will assist in protecting the affected area, provide functional support and facilitate healing. The patient was educated and fit by a healthcare professional with expert knowledge and specialization in brace application while under the direct supervision of the treating physician. Verbal and written instructions for the use of and application of this item were provided. They were instructed to contact the office immediately should the brace result in increased pain, decreased sensation, increased swelling or worsening of the condition.

## 2023-07-02 DIAGNOSIS — E78.00 PURE HYPERCHOLESTEROLEMIA: ICD-10-CM

## 2023-07-03 RX ORDER — ROSUVASTATIN CALCIUM 40 MG/1
TABLET, COATED ORAL
Qty: 90 TABLET | Refills: 1 | Status: SHIPPED | OUTPATIENT
Start: 2023-07-03

## 2023-11-09 DIAGNOSIS — I10 ESSENTIAL HYPERTENSION: Primary | ICD-10-CM

## 2023-11-09 NOTE — TELEPHONE ENCOUNTER
Last ov: 10/11/22 VSP  Upcoming ov: 12/18/23 VSP    CMP- 06/24/22    Attempted to reach pt, left vm to call office back. Please let pt know they need a BMP for refill. Lab ordered.

## 2023-11-10 NOTE — TELEPHONE ENCOUNTER
Attempted to reach pt, left vm to call office back. Please let pt know they need a BMP for refill. Lab ordered.

## 2023-11-14 RX ORDER — CARVEDILOL 3.12 MG/1
TABLET ORAL
Qty: 180 TABLET | Refills: 0 | Status: SHIPPED | OUTPATIENT
Start: 2023-11-14

## 2023-11-29 ENCOUNTER — HOSPITAL ENCOUNTER (OUTPATIENT)
Age: 48
Discharge: HOME | End: 2023-11-29
Payer: COMMERCIAL

## 2023-11-29 DIAGNOSIS — I10 ESSENTIAL HYPERTENSION: ICD-10-CM

## 2023-11-29 LAB
ANION GAP SERPL CALCULATED.3IONS-SCNC: 7 MMOL/L (ref 3–16)
BUN SERPL-MCNC: 14 MG/DL (ref 7–20)
CALCIUM SERPL-MCNC: 9.2 MG/DL (ref 8.3–10.6)
CHLORIDE SERPL-SCNC: 103 MMOL/L (ref 99–110)
CO2 SERPL-SCNC: 29 MMOL/L (ref 21–32)
CREAT SERPL-MCNC: 0.7 MG/DL (ref 0.6–1.1)
GFR SERPLBLD CREATININE-BSD FMLA CKD-EPI: >60 ML/MIN/{1.73_M2}
GLUCOSE SERPL-MCNC: 110 MG/DL (ref 70–99)
POTASSIUM SERPL-SCNC: 4 MMOL/L (ref 3.5–5.1)
SODIUM SERPL-SCNC: 139 MMOL/L (ref 136–145)

## 2023-11-29 PROCEDURE — 80048 BASIC METABOLIC PNL TOTAL CA: CPT

## 2023-11-29 PROCEDURE — 36415 COLL VENOUS BLD VENIPUNCTURE: CPT

## 2023-12-01 ENCOUNTER — TELEPHONE (OUTPATIENT)
Dept: CARDIOLOGY CLINIC | Age: 48
End: 2023-12-01

## 2023-12-01 NOTE — TELEPHONE ENCOUNTER
----- Message from Aminta Galeana DO sent at 11/30/2023  5:20 PM EST -----  Creatinine and electrolytes are stable.

## 2023-12-01 NOTE — TELEPHONE ENCOUNTER
Created telephone encounter. Spoke with Marylu Saravia relayed message per [facility] regarding labs. Pt verbalized understanding.

## 2023-12-11 ENCOUNTER — HOSPITAL ENCOUNTER (OUTPATIENT)
Dept: MAMMOGRAPHY | Age: 48
Discharge: HOME | End: 2023-12-11
Payer: COMMERCIAL

## 2023-12-11 DIAGNOSIS — Z12.39 BREAST SCREENING: ICD-10-CM

## 2023-12-11 PROCEDURE — 77063 BREAST TOMOSYNTHESIS BI: CPT

## 2024-01-26 ENCOUNTER — TELEMEDICINE (OUTPATIENT)
Dept: PULMONOLOGY | Age: 49
End: 2024-01-26
Payer: COMMERCIAL

## 2024-01-26 ENCOUNTER — TELEPHONE (OUTPATIENT)
Dept: PULMONOLOGY | Age: 49
End: 2024-01-26

## 2024-01-26 DIAGNOSIS — G47.33 MILD OBSTRUCTIVE SLEEP APNEA: Primary | ICD-10-CM

## 2024-01-26 DIAGNOSIS — I10 ESSENTIAL HYPERTENSION: ICD-10-CM

## 2024-01-26 DIAGNOSIS — Z71.89 CPAP USE COUNSELING: ICD-10-CM

## 2024-01-26 PROCEDURE — 99213 OFFICE O/P EST LOW 20 MIN: CPT | Performed by: NURSE PRACTITIONER

## 2024-01-26 ASSESSMENT — SLEEP AND FATIGUE QUESTIONNAIRES
HOW LIKELY ARE YOU TO NOD OFF OR FALL ASLEEP WHILE WATCHING TV: 1
HOW LIKELY ARE YOU TO NOD OFF OR FALL ASLEEP IN A CAR, WHILE STOPPED FOR A FEW MINUTES IN TRAFFIC: 0
HOW LIKELY ARE YOU TO NOD OFF OR FALL ASLEEP WHILE SITTING AND TALKING TO SOMEONE: 0
ESS TOTAL SCORE: 2
HOW LIKELY ARE YOU TO NOD OFF OR FALL ASLEEP WHILE SITTING AND READING: 0
HOW LIKELY ARE YOU TO NOD OFF OR FALL ASLEEP WHEN YOU ARE A PASSENGER IN A CAR FOR AN HOUR WITHOUT A BREAK: 0
HOW LIKELY ARE YOU TO NOD OFF OR FALL ASLEEP WHILE SITTING QUIETLY AFTER LUNCH WITHOUT ALCOHOL: 0
HOW LIKELY ARE YOU TO NOD OFF OR FALL ASLEEP WHILE SITTING INACTIVE IN A PUBLIC PLACE: 0
HOW LIKELY ARE YOU TO NOD OFF OR FALL ASLEEP WHILE LYING DOWN TO REST IN THE AFTERNOON WHEN CIRCUMSTANCES PERMIT: 1

## 2024-10-14 ENCOUNTER — HOSPITAL ENCOUNTER (OUTPATIENT)
Dept: CT IMAGING | Age: 49
Discharge: HOME | End: 2024-10-14
Payer: COMMERCIAL

## 2024-10-14 DIAGNOSIS — Z12.2 SCREENING FOR MALIGNANT NEOPLASM OF RESPIRATORY ORGAN: ICD-10-CM

## 2024-10-14 DIAGNOSIS — F17.211 CIGARETTE NICOTINE DEPENDENCE IN REMISSION: ICD-10-CM

## 2024-10-14 DIAGNOSIS — Z87.891 PERSONAL HISTORY OF TOBACCO USE, PRESENTING HAZARDS TO HEALTH: ICD-10-CM

## 2024-10-14 PROCEDURE — 71271 CT THORAX LUNG CANCER SCR C-: CPT

## 2024-11-11 ENCOUNTER — TELEPHONE (OUTPATIENT)
Dept: CARDIOLOGY CLINIC | Age: 49
End: 2024-11-11

## 2024-11-11 NOTE — TELEPHONE ENCOUNTER
The patient's cardiac condition is not prohibitory to undergo surgery. The patient's cardiac risk is moderate based upon my evaluation and testing. Stable to proceed.

## 2024-11-11 NOTE — TELEPHONE ENCOUNTER
CARDIAC CLEARANCE REQUEST    What type of procedure are you having:  colonoscopy  Are you taking any blood thinners:  N/a  Type on anesthesia:  general  When is your procedure scheduled for:  11/12/2024  What physician is performing your procedure:  Dr. Pinto  Phone Number:  [phone]  Fax number to send the letter:  [phone]    Last ov 12/18/2023 vsp  Next ov 12/12/2024 vsp    They will need a copy of pts last ov note.

## 2024-12-06 DIAGNOSIS — E78.00 PURE HYPERCHOLESTEROLEMIA: ICD-10-CM

## 2024-12-06 RX ORDER — ROSUVASTATIN CALCIUM 40 MG/1
TABLET, COATED ORAL
Qty: 30 TABLET | Refills: 0 | Status: SHIPPED | OUTPATIENT
Start: 2024-12-06

## 2024-12-06 NOTE — TELEPHONE ENCOUNTER
Last Office Visit: 12/18/2023 Provider: KOBY  Is provider OOT? No    Next Office Visit: 12/12/2024 Provider: KOBY    LAST LABS:   Liver:   Lab Results   Component Value Date    ALT 20 06/24/2022    AST 19 06/24/2022    ALKPHOS 80 06/24/2022    BILITOT 0.4 06/24/2022      Lipid:   Lab Results   Component Value Date    CHOL 116 03/28/2019    TRIG 99 03/28/2019    HDL 41 03/28/2019    LDL 55 03/28/2019    VLDL 20 03/28/2019         Is encounter provider correct?   Yes  Does refill dosage match last filled?   Yes  Changes to script from Tele Encounters or LOV Plan?   no  Did you adjust dispensed amount or refills to reflect last and upcoming OV?  Yes    Requested Prescriptions     Pending Prescriptions Disp Refills    rosuvastatin (CRESTOR) 40 MG tablet [Pharmacy Med Name: ROSUVASTATIN TAB 40MG] 30 tablet 0     Sig: TAKE 1 TABLET DAILY

## 2024-12-12 ENCOUNTER — OFFICE VISIT (OUTPATIENT)
Dept: CARDIOLOGY CLINIC | Age: 49
End: 2024-12-12
Payer: COMMERCIAL

## 2024-12-12 VITALS
WEIGHT: 178.5 LBS | SYSTOLIC BLOOD PRESSURE: 116 MMHG | BODY MASS INDEX: 30.48 KG/M2 | DIASTOLIC BLOOD PRESSURE: 74 MMHG | OXYGEN SATURATION: 96 % | HEIGHT: 64 IN | HEART RATE: 101 BPM

## 2024-12-12 DIAGNOSIS — R00.0 TACHYCARDIA: ICD-10-CM

## 2024-12-12 DIAGNOSIS — E78.00 PURE HYPERCHOLESTEROLEMIA: ICD-10-CM

## 2024-12-12 DIAGNOSIS — I25.10 CORONARY ARTERY DISEASE INVOLVING NATIVE CORONARY ARTERY OF NATIVE HEART WITHOUT ANGINA PECTORIS: Primary | ICD-10-CM

## 2024-12-12 DIAGNOSIS — Z95.5 HISTORY OF CORONARY ARTERY STENT PLACEMENT: ICD-10-CM

## 2024-12-12 PROCEDURE — 3074F SYST BP LT 130 MM HG: CPT | Performed by: INTERNAL MEDICINE

## 2024-12-12 PROCEDURE — 3078F DIAST BP <80 MM HG: CPT | Performed by: INTERNAL MEDICINE

## 2024-12-12 PROCEDURE — 93000 ELECTROCARDIOGRAM COMPLETE: CPT | Performed by: INTERNAL MEDICINE

## 2024-12-12 PROCEDURE — 99214 OFFICE O/P EST MOD 30 MIN: CPT | Performed by: INTERNAL MEDICINE

## 2024-12-12 RX ORDER — CARVEDILOL 3.12 MG/1
3.12 TABLET ORAL 2 TIMES DAILY
Qty: 180 TABLET | Refills: 3 | Status: SHIPPED | OUTPATIENT
Start: 2024-12-12

## 2024-12-12 RX ORDER — BUPROPION HCL 150 MG
150 TABLET, EXTENDED RELEASE 24 HR ORAL EVERY MORNING
COMMUNITY
Start: 2024-07-15

## 2024-12-12 RX ORDER — ROSUVASTATIN CALCIUM 40 MG/1
TABLET, COATED ORAL
Qty: 90 TABLET | Refills: 3 | Status: SHIPPED | OUTPATIENT
Start: 2024-12-12

## 2024-12-12 RX ORDER — AMLODIPINE BESYLATE 2.5 MG/1
2.5 TABLET ORAL DAILY
COMMUNITY
End: 2024-12-12 | Stop reason: ALTCHOICE

## 2024-12-12 NOTE — PROGRESS NOTES
[facility]   Cardiovascular Evaluation    PATIENT: Enedina Hopkins  DATE: 2024  MRN: [id number]  CSN: [id number]  : 1975    Primary Care Doctor: Shahbaz Ramirez APRN - CNP    Reason for evaluation/Chief complaint:   Follow-up, Coronary Artery Disease, Hypertension, and Hyperlipidemia      Subjective:    History of present illness on initial date of evaluation:   Enedina Hopkins is a 49 y.o. female patient who presents for cardiology follow up. She has a medical history significant of STEMI, CAD, HTN, HLD, LINDSAY, and tobacco abuse. She underwent emergent LHC 2018 in setting of STEMI which resulted in PCI to 1st diagonal with LAURA. Echo 2018 EF 65%, mild LVH, Grade I DD, and trivial MR.    She was admitted to the hospital -22 with complaints of chest pain. She reported similar symptoms prior to stent placement. Troponin negative. EKG negative for ACS. GXT stress myoview performed 22 normal perfusion study.   Today she reports she is doing well. She reports heart rate elevated trying to get into the office appointment. She monitors her blood pressure and heart rate at home. She has followed with her PCP Shahbaz Ramirez APRN - CNP and prescribed Wellbutrin for anxiety. Patient currently denies any weight gain, edema, palpitations, chest pain, shortness of breath, dizziness, and syncope. She is taking medications as prescribed, tolerating well. She is taking OTC aspirin. She does report in past dizziness with taking coreg twice a day.        Patient Active Problem List   Diagnosis    Acute ST elevation myocardial infarction (STEMI) involving left anterior descending (LAD) coronary artery without development of Q waves (HCC)    STEMI (ST elevation myocardial infarction) (HCC) 18 PCI to DIAG     Chest pain    Coronary artery disease involving native coronary artery of native heart without angina pectoris    Shortness of breath    Essential

## 2024-12-12 NOTE — PATIENT INSTRUCTIONS
Plan:  EKG reviewed  Great job on not smoking. Continue to avoid smoking as this is a risk factor to heart attack, stroke, or cancer.  Patient given detailed instructions on addressing diet, regular exercise, weight control, smoking abstention, medication compliance, and stress minimization. The patient was provided written and verbal instructions regarding risk factor modification.    Discontinue amlodipine (Norvasc)  Await a few days and then start taking carvedilol (coreg) 3.125 mg twice a day ~ tachycardia  Recommend monitoring blood pressure at home 4-5 times a week. Keep a log of blood pressure and heart rate. Goal 130/80 or less and heart rate less than 100 at rest. Call office for further intervention if consistently elevated above goal.    Follow up with me in 1 year

## 2024-12-20 ENCOUNTER — HOSPITAL ENCOUNTER (OUTPATIENT)
Dept: MAMMOGRAPHY | Age: 49
Discharge: HOME | End: 2024-12-20
Payer: COMMERCIAL

## 2024-12-20 VITALS — BODY MASS INDEX: 29.02 KG/M2 | HEIGHT: 64 IN | WEIGHT: 170 LBS

## 2024-12-20 DIAGNOSIS — Z12.31 ENCOUNTER FOR SCREENING MAMMOGRAM FOR BREAST CANCER: ICD-10-CM

## 2024-12-20 PROCEDURE — 77063 BREAST TOMOSYNTHESIS BI: CPT

## 2025-01-05 DIAGNOSIS — I25.10 CORONARY ARTERY DISEASE INVOLVING NATIVE CORONARY ARTERY OF NATIVE HEART WITHOUT ANGINA PECTORIS: ICD-10-CM

## 2025-01-05 DIAGNOSIS — E78.00 PURE HYPERCHOLESTEROLEMIA: ICD-10-CM

## 2025-01-08 RX ORDER — ROSUVASTATIN CALCIUM 40 MG/1
TABLET, COATED ORAL
Qty: 30 TABLET | Refills: 0 | Status: SHIPPED | OUTPATIENT
Start: 2025-01-08

## 2025-01-31 ENCOUNTER — TELEMEDICINE (OUTPATIENT)
Dept: PULMONOLOGY | Age: 50
End: 2025-01-31
Payer: COMMERCIAL

## 2025-01-31 ENCOUNTER — TELEPHONE (OUTPATIENT)
Dept: PULMONOLOGY | Age: 50
End: 2025-01-31

## 2025-01-31 DIAGNOSIS — Z71.89 CPAP USE COUNSELING: ICD-10-CM

## 2025-01-31 DIAGNOSIS — I10 ESSENTIAL HYPERTENSION: ICD-10-CM

## 2025-01-31 DIAGNOSIS — G47.33 MILD OBSTRUCTIVE SLEEP APNEA: Primary | ICD-10-CM

## 2025-01-31 PROCEDURE — 99213 OFFICE O/P EST LOW 20 MIN: CPT | Performed by: NURSE PRACTITIONER

## 2025-01-31 ASSESSMENT — SLEEP AND FATIGUE QUESTIONNAIRES
HOW LIKELY ARE YOU TO NOD OFF OR FALL ASLEEP WHILE LYING DOWN TO REST IN THE AFTERNOON WHEN CIRCUMSTANCES PERMIT: SLIGHT CHANCE OF DOZING
ESS TOTAL SCORE: 1
HOW LIKELY ARE YOU TO NOD OFF OR FALL ASLEEP WHILE SITTING AND TALKING TO SOMEONE: WOULD NEVER DOZE
HOW LIKELY ARE YOU TO NOD OFF OR FALL ASLEEP WHILE WATCHING TV: WOULD NEVER DOZE
HOW LIKELY ARE YOU TO NOD OFF OR FALL ASLEEP WHILE SITTING QUIETLY AFTER LUNCH WITHOUT ALCOHOL: WOULD NEVER DOZE
HOW LIKELY ARE YOU TO NOD OFF OR FALL ASLEEP WHEN YOU ARE A PASSENGER IN A CAR FOR AN HOUR WITHOUT A BREAK: WOULD NEVER DOZE
HOW LIKELY ARE YOU TO NOD OFF OR FALL ASLEEP WHILE SITTING INACTIVE IN A PUBLIC PLACE: WOULD NEVER DOZE
HOW LIKELY ARE YOU TO NOD OFF OR FALL ASLEEP WHILE SITTING AND READING: WOULD NEVER DOZE
HOW LIKELY ARE YOU TO NOD OFF OR FALL ASLEEP IN A CAR, WHILE STOPPED FOR A FEW MINUTES IN TRAFFIC: WOULD NEVER DOZE

## 2025-01-31 NOTE — PROGRESS NOTES
Patient ID: Enedina Hopkins is a 49 y.o. female who is being seen today for   Chief Complaint   Patient presents with    Follow-up    Sleep Apnea     Referring: LUIS Calles    HPI:   Enedina Hopkins is a 49 y.o. female for televideo appointment via video and audio virtual visit for [facility] follow up. States she loves CPAP.   Patient is using CPAP   7hrs/night. Using humidifier. No snoring on CPAP. The pressure is well tolerated. The mask is comfortable- full face. No mask leak. No significant daytime sleepiness. No nodding off when driving. No dry nose or throat. No fatigue. Bedtime is 2425-7699 pm and rise time is 7 am. Sleep onset is 20-30 minutes. Wakes up 1-2 times at night total. No nocturia. It takes few minutes to fall back a sleep. Rare naps during the day. No headache in am. No weight gain. No caffienated beverages during the day. No alcohol. ESS is 1          1/31/2025     8:09 AM 1/26/2024     8:18 AM 12/16/2022     8:48 AM 12/16/2022     8:32 AM 6/10/2022     9:33 AM   Sleep Medicine   Sitting and reading 0 0 1 1 2   Watching TV 0 1 0 0 2   Sitting, inactive in a public place (e.g. a theatre or a meeting) 0 0 0 0 2   As a passenger in a car for an hour without a break 0 0 1 1 2   Lying down to rest in the afternoon when circumstances permit 1 1 1 1 3   Sitting and talking to someone 0 0 0 0 0   Sitting quietly after a lunch without alcohol 0 0 1 1 3   In a car, while stopped for a few minutes in traffic 0 0 0 1 1   [place] Sleepiness Score 1 2 4 5 15       Past Medical History:  Past Medical History:   Diagnosis Date    CAD (coronary artery disease)     Depression     Hyperlipidemia     Hypertension        Past Surgical History:        Procedure Laterality Date    CARPAL TUNNEL RELEASE Left 5/8/2023    LEFT CARPAL TUNNEL RELEASE [place] [facility] [place]    CARPAL TUNNEL RELEASE Right 5/26/2023    RIGHT CARPAL TUNNEL RELEASE performed by Christophe Orosco [place] [facility] [place]

## (undated) DEVICE — Z CONVERTED USE 2273163 BANDAGE COMPR W3INXL4.5YD LTWT E EC SGL LAYERED CLP CLSR

## (undated) DEVICE — Z CONVERTED USE 2271375 BANDAGE COMPR CLP DETACHED CLOSURE 5 YDX3 IN REINF TAN REB

## (undated) DEVICE — NEEDLE HYPO 25GA L1.5IN BLU POLYPR HUB S STL REG BVL STR

## (undated) DEVICE — DRESSING,GAUZE,XEROFORM,CURAD,1"X8",ST: Brand: CURAD

## (undated) DEVICE — GOWN,AURORA,NONREINF,RAGLAN,XXL,STERILE: Brand: MEDLINE

## (undated) DEVICE — Z CONVERTED USE 2273164 BANDAGE COMPR W4INXL4 1/2YD E EC SGL LAYERED CLP CLSR ECONO

## (undated) DEVICE — BANDAGE COMPR W4INXL15FT BGE E SGL LAYERED CLP CLSR

## (undated) DEVICE — PADDING CAST CRIMPED FINISH STD 4 YDX3 IN COTTON WBRL II

## (undated) DEVICE — Z DUP USE 2522782 SOLUTION IRRIG 1000ML STRL H2O PLAS CONTAINER UROMATIC

## (undated) DEVICE — SPLINT ORTH W3XL12IN LAYERED FBRGLS FOAM PD BRTH BK MOLD

## (undated) DEVICE — DBD-PACK,CYSTOSCOPY,PK VI,AURORA: Brand: MEDLINE

## (undated) DEVICE — GOWN,SIRUS,NONRNF,3XL,18/CS: Brand: MEDLINE

## (undated) DEVICE — Z INACTIVE USE 2855096 SPONGE GZ W4XL4IN 8 PLY 100% COT

## (undated) DEVICE — PREP SOL PVP IODINE 4%  4 OZ/BTL

## (undated) DEVICE — APPLICATOR MEDICATED 26 CC SOLUTION HI LT ORNG CHLORAPREP

## (undated) DEVICE — SOLUTION IV IRRIG 500ML 0.9% SODIUM CHL 2F7123

## (undated) DEVICE — GAUZE,SPONGE,4"X4",8PLY,STRL,LF,10/TRAY: Brand: MEDLINE

## (undated) DEVICE — CANNULA NSL 13FT TUBE AD ETCO2 DIV SAMP M

## (undated) DEVICE — SUTURE NONABSORBABLE MONOFILAMENT 4-0 FS-2 18 IN ETHILON 662H

## (undated) DEVICE — BOWL MED L 32OZ PLAS W/ MOLD GRAD EZ OPN PEEL PCH

## (undated) DEVICE — CYSTO/BLADDER IRRIGATION SET, REGULATING CLAMP

## (undated) DEVICE — BAG URIN STRL FOR URO CTCH SYS

## (undated) DEVICE — GOWN SIRUS NONREIN LG W/TWL: Brand: MEDLINE INDUSTRIES, INC.

## (undated) DEVICE — SUTURE ETHLN SZ 4-0 L18IN NONABSORBABLE BLK L19MM PS-2 3/8 1667H

## (undated) DEVICE — SYRINGE, LUER LOCK, 10ML: Brand: MEDLINE

## (undated) DEVICE — PACK ORTH LO EXT VI

## (undated) DEVICE — GLOVE ORANGE PI 8 1/2   MSG9085

## (undated) DEVICE — MAJOR SET UP PK

## (undated) DEVICE — MEDI-VAC NON-CONDUCTIVE SUCTION TUBING: Brand: CARDINAL HEALTH

## (undated) DEVICE — SYRINGE MED 10ML LUERLOCK TIP W/O SFTY DISP

## (undated) DEVICE — NEEDLE HYPO 18GA L1.5IN PNK POLYPR HUB S STL THN WALL FILL

## (undated) DEVICE — GLOVE ORANGE PI 8   MSG9080